# Patient Record
Sex: MALE | Race: WHITE | Employment: FULL TIME | URBAN - METROPOLITAN AREA
[De-identification: names, ages, dates, MRNs, and addresses within clinical notes are randomized per-mention and may not be internally consistent; named-entity substitution may affect disease eponyms.]

---

## 2020-10-08 ENCOUNTER — OFFICE VISIT (OUTPATIENT)
Dept: URGENT CARE | Age: 60
End: 2020-10-08
Payer: COMMERCIAL

## 2020-10-08 VITALS — RESPIRATION RATE: 16 BRPM | HEART RATE: 100 BPM | TEMPERATURE: 98.9 F | OXYGEN SATURATION: 97 %

## 2020-10-08 DIAGNOSIS — Z20.822 CLOSE EXPOSURE TO COVID-19 VIRUS: Primary | ICD-10-CM

## 2020-10-08 PROCEDURE — 99203 OFFICE O/P NEW LOW 30 MIN: CPT | Performed by: FAMILY MEDICINE

## 2020-10-08 NOTE — PROGRESS NOTES
This patient was seen in [facility] at [address] [place] [address] Urgent Care while in their vehicle due to COVID-19 pandemic with PPE and focused examination in order to decrease community viral transmission. The patient/guardian gave verbal consent to treat. Alexa Guerrero is a 61 y.o. male who presents for COVID-19 testing. Was exposed to COVID-19 by someone while at a work site, last contact 6 days ago. Denies cough, fever, SOB. PMH: none. Non-smoker. The history is provided by the patient. History reviewed. No pertinent past medical history. History reviewed. No pertinent surgical history. History reviewed. No pertinent family history.      Social History     Socioeconomic History    Marital status:      Spouse name: Not on file    Number of children: Not on file    Years of education: Not on file    Highest education level: Not on file   Occupational History    Not on file   Social Needs    Financial resource strain: Not on file    Food insecurity     Worry: Not on file     Inability: Not on file    Transportation needs     Medical: Not on file     Non-medical: Not on file   Tobacco Use    Smoking status: Never Smoker    Smokeless tobacco: Never Used   Substance and Sexual Activity    Alcohol use: Not on file    Drug use: Not on file    Sexual activity: Not on file   Lifestyle    Physical activity     Days per week: Not on file     Minutes per session: Not on file    Stress: Not on file   Relationships    Social connections     Talks on phone: Not on file     Gets together: Not on file     Attends Gnosticist service: Not on file     Active member of club or organization: Not on file     Attends meetings of clubs or organizations: Not on file     Relationship status: Not on file    Intimate partner violence     Fear of current or ex partner: Not on file     Emotionally abused: Not on file     Physically abused: Not on file     Forced sexual activity: Not on file   Other Topics Concern    Not on file   Social History Narrative    Not on file                ALLERGIES: Patient has no known allergies. Review of Systems   Constitutional: Negative for activity change, appetite change, chills and fever. HENT: Negative for congestion, rhinorrhea and sore throat. Respiratory: Negative for cough, shortness of breath and wheezing. Cardiovascular: Negative for chest pain. Gastrointestinal: Negative for abdominal pain, diarrhea, nausea and vomiting. Musculoskeletal: Negative for myalgias. Neurological: Negative for headaches. Vitals:    10/08/20 1242   Pulse: 100   Resp: 16   Temp: 98.9 °F (37.2 °C)   SpO2: 97%       Physical Exam  Vitals signs and nursing note reviewed. Constitutional:       General: He is not in acute distress. Appearance: He is well-developed. He is not diaphoretic. Pulmonary:      Effort: Pulmonary effort is normal. No respiratory distress. Breath sounds: Normal breath sounds. No stridor. No wheezing, rhonchi or rales. Neurological:      Mental Status: He is alert. Psychiatric:         Behavior: Behavior normal.         Thought Content: Thought content normal.         Judgment: Judgment normal.         MDM    ICD-10-CM ICD-9-CM   1. Close exposure to COVID-19 virus  Z20.828 V01.79       Orders Placed This Encounter    NOVEL CORONAVIRUS (COVID-19)     Scheduling Instructions:      1) Due to current limited availability of the COVID-19 PCR test, tests will be prioritized and may not be completed.              2) Order only if the test result will change clinical management or necessary for a return to mission-critical employment decision.              3) Print and instruct patient to adhere to [facility] home isolation program. (Link Above)              4) Set up or refer patient for a monitoring program.              5) Have patient sign up for and leverage MyChart (if not previously done).      Order Specific Question:   Is this test for diagnosis or screening? Answer:   Screening     Order Specific Question:   Symptomatic for COVID-19 as defined by CDC? Answer:   No     Order Specific Question:   Hospitalized for COVID-19? Answer:   No     Order Specific Question:   Admitted to ICU for COVID-19? Answer:   No     Order Specific Question:   Employed in healthcare setting? Answer:   No     Order Specific Question:   Resident in a congregate (group) care setting? Answer:   No     Order Specific Question:   Previously tested for COVID-19? Answer:   No        Quarantine, await results      If signs and symptoms become worse the pt is to go to the ER.          Procedures

## 2020-10-08 NOTE — LETTER
October 8, 2020 Shanna Tang [phone] Dear Jennifer Palacios: 
Thank you for requesting access to [employer]. Please follow the instructions below to securely access and download your online medical record. [employer] allows you to send messages to your doctor, view your test results, renew your prescriptions, schedule appointments, and more. How Do I Sign Up? 1. In your internet browser, go to [email or web address]. 2. Click on the First Time User? Click Here link in the Sign In box. You will see the New Member Sign Up page. 3. Enter your [employer] Access Code exactly as it appears below. You will not need to use this code after youve completed the sign-up process. If you do not sign up before the expiration date, you must request a new code. [employer] Access Code: [email or web address] Expires: 11/22/2020 12:35 PM  
 
4. Enter the last four digits of your Social Security Number (xxxx) and Date of Birth (mm/dd/yyyy) as indicated and click Submit. You will be taken to the next sign-up page. 5. Create a [employer] ID. This will be your [employer] login ID and cannot be changed, so think of one that is secure and easy to remember. 6. Create a [employer] password. You can change your password at any time. 7. Enter your Password Reset Question and Answer. This can be used at a later time if you forget your password. 8. Enter your e-mail address. You will receive e-mail notification when new information is available in [address]. 9. Click Sign Up. You can now view and download portions of your medical record. 10. Click the Download Summary menu link to download a portable copy of your medical information. Additional Information If you have questions, please visit the Frequently Asked Questions section of the [employer] website at [email or web address] Remember, [employer] is NOT to be used for urgent needs. For medical emergencies, dial 911. Now available from your iPhone and Android! Sincerely, [employer]

## 2020-10-11 LAB — SARS-COV-2, NAA: NOT DETECTED

## 2021-08-01 ENCOUNTER — HOSPITAL ENCOUNTER (EMERGENCY)
Age: 61
Discharge: HOME | End: 2021-08-01
Attending: EMERGENCY MEDICINE
Payer: COMMERCIAL

## 2021-08-01 VITALS
OXYGEN SATURATION: 96 % | BODY MASS INDEX: 28.29 KG/M2 | SYSTOLIC BLOOD PRESSURE: 146 MMHG | DIASTOLIC BLOOD PRESSURE: 92 MMHG | WEIGHT: 191 LBS | HEIGHT: 69 IN | HEART RATE: 92 BPM | TEMPERATURE: 97.1 F | RESPIRATION RATE: 16 BRPM

## 2021-08-01 DIAGNOSIS — K12.0 ORAL APHTHOUS ULCER: Primary | ICD-10-CM

## 2021-08-01 DIAGNOSIS — L73.9 FOLLICULITIS: ICD-10-CM

## 2021-08-01 PROCEDURE — 99281 EMR DPT VST MAYX REQ PHY/QHP: CPT

## 2021-08-01 RX ORDER — CEPHALEXIN 500 MG/1
500 CAPSULE ORAL 3 TIMES DAILY
Qty: 21 CAPSULE | Refills: 0 | Status: SHIPPED | OUTPATIENT
Start: 2021-08-01 | End: 2021-08-08

## 2021-08-01 NOTE — ED TRIAGE NOTES
Patient reports sores in his mouth, skin rash for about 11/2 -2 weeks. Patient reports no fever, no difficulty swallowing, no other symptoms. Patient is ambulated in Triage.

## 2021-08-01 NOTE — ED PROVIDER NOTES
70-year-old male presents with mouth ulcerations and bumps on his lower legs. Started about a week and 1/2 to 2 weeks ago. He denies any fevers or chills. The mouth ulcerations are very painful. He did rates them 5 out of 10 in severity. He denies any nausea or vomiting. Denies any difficulty swallowing. He denies any other symptoms at this time. No past medical history on file. No past surgical history on file. No family history on file. Social History     Socioeconomic History    Marital status:      Spouse name: Not on file    Number of children: Not on file    Years of education: Not on file    Highest education level: Not on file   Occupational History    Not on file   Tobacco Use    Smoking status: Never Smoker    Smokeless tobacco: Never Used   Substance and Sexual Activity    Alcohol use: Not on file    Drug use: Not on file    Sexual activity: Not on file   Other Topics Concern    Not on file   Social History Narrative    Not on file     Social Determinants of Health     Financial Resource Strain:     Difficulty of Paying Living Expenses:    Food Insecurity:     Worried About Running Out of Food in the Last Year:     [address] in the Last Year:    Transportation Needs:     Lack of Transportation (Medical):  Lack of Transportation (Non-Medical):    Physical Activity:     Days of Exercise per Week:     Minutes of Exercise per Session:    Stress:     Feeling of Stress :    Social Connections:     Frequency of Communication with Friends and Family:     Frequency of Social Gatherings with Friends and Family:     Attends Sabianism Services:     Active Member of Clubs or Organizations:     Attends Club or Organization Meetings:     Marital Status:    Intimate Partner Violence:     Fear of Current or Ex-Partner:     Emotionally Abused:     Physically Abused:     Sexually Abused: ALLERGIES: Patient has no known allergies.     Review of Systems   All other systems reviewed and are negative. There were no vitals filed for this visit. Physical Exam  Vitals and nursing note reviewed. Constitutional:       General: He is not in acute distress. HENT:      Head: Normocephalic and atraumatic. Mouth/Throat:      Comments: Ulcerations under the tongue and right buccal mucosa. No sublingual swelling. Eyes:      General: No scleral icterus. Conjunctiva/sclera: Conjunctivae normal.      Pupils: Pupils are equal, round, and reactive to light. Neck:      Trachea: No tracheal deviation. Cardiovascular:      Rate and Rhythm: Normal rate and regular rhythm. Pulmonary:      Effort: Pulmonary effort is normal. No respiratory distress. Breath sounds: Normal breath sounds. No stridor. Abdominal:      General: There is no distension. Palpations: Abdomen is soft. Tenderness: There is no abdominal tenderness. Genitourinary:     Comments: deferred  Musculoskeletal:         General: No deformity. Cervical back: No rigidity. Skin:     General: Skin is warm and dry. Comments: Pustules on bilateral thighs. Neurological:      General: No focal deficit present. Mental Status: He is alert. Psychiatric:         Mood and Affect: Mood normal.         Behavior: Behavior normal.          MDM       Procedures        6:43 PM  Patient re-evaluated. All questions answered. Patient appropriate for discharge. Given return precautions and follow up instructions. LABORATORY TESTS:  Labs Reviewed - No data to display    IMAGING RESULTS:  No orders to display       MEDICATIONS GIVEN:  Medications - No data to display    IMPRESSION:  1. Oral aphthous ulcer    2. Folliculitis        PLAN:  1.    Current Discharge Medication List      START taking these medications    Details   aluminum-magnesium hydroxide 200-200 mg/5 mL susp 5 mL, diphenhydrAMINE 12.5 mg/5 mL liqd 12.5 mg, lidocaine 2 % soln 5 mL 5 mL by Swish and Spit route two (2) times a day for 7 days. Magic mouth wash   Maalox  Lidocaine 2% viscous   Diphenhydramine oral solution     Pharmacy to mix equal portions of ingredients to a total volume as indicated in the dispense amount. Qty: 100 mL, Refills: 0  Start date: 8/1/2021, End date: 8/8/2021      cephALEXin (Keflex) 500 mg capsule Take 1 Capsule by mouth three (3) times daily for 7 days. Qty: 21 Capsule, Refills: 0  Start date: 8/1/2021, End date: 8/8/2021           2. Follow-up Information     Follow up With Specialties Details Why [address] [place] [address],[address]  Schedule an appointment as soon as possible for a visit   [address] [place] [address]  [phone]    [facility] EM[facility] Emergency Medicine  If symptoms worsen or new concerns [address] [place] [address]  [phone]        3. Return to ED for new or worsening symptoms       Siddharth Dunn. Noah France MD        Please note that this dictation was completed with [employer], the [employer] voice recognition software. Quite often unanticipated grammatical, syntax, homophones, and other interpretive errors are inadvertently transcribed by the computer software. Please disregard these errors. Please excuse any errors that have escaped final proofreading.

## 2021-08-02 NOTE — ED NOTES
1:16 PM  Called by mindy - 'couldn't get the 'magic mouthwash' with al hydrox and pepto/ we have it with simethicone'; approved substitution;

## 2021-11-07 ENCOUNTER — APPOINTMENT (OUTPATIENT)
Dept: GENERAL RADIOLOGY | Age: 61
End: 2021-11-07
Attending: NURSE PRACTITIONER
Payer: COMMERCIAL

## 2021-11-07 ENCOUNTER — HOSPITAL ENCOUNTER (EMERGENCY)
Age: 61
Discharge: HOME | End: 2021-11-07
Attending: EMERGENCY MEDICINE
Payer: COMMERCIAL

## 2021-11-07 VITALS
SYSTOLIC BLOOD PRESSURE: 118 MMHG | RESPIRATION RATE: 18 BRPM | DIASTOLIC BLOOD PRESSURE: 79 MMHG | OXYGEN SATURATION: 97 % | TEMPERATURE: 99 F | HEART RATE: 100 BPM

## 2021-11-07 DIAGNOSIS — J18.9 PNEUMONIA OF LEFT LUNG DUE TO INFECTIOUS ORGANISM, UNSPECIFIED PART OF LUNG: Primary | ICD-10-CM

## 2021-11-07 DIAGNOSIS — U07.1 COVID-19: ICD-10-CM

## 2021-11-07 DIAGNOSIS — Z20.822 EXPOSURE TO COVID-19 VIRUS: ICD-10-CM

## 2021-11-07 LAB
ALBUMIN SERPL-MCNC: 3.4 G/DL (ref 3.5–5)
ALBUMIN/GLOB SERPL: 1 {RATIO} (ref 1.1–2.2)
ALP SERPL-CCNC: 66 U/L (ref 45–117)
ALT SERPL-CCNC: 25 U/L (ref 12–78)
ANION GAP SERPL CALC-SCNC: 10 MMOL/L (ref 5–15)
AST SERPL-CCNC: 24 U/L (ref 15–37)
BASOPHILS # BLD: 0.1 K/UL (ref 0–0.1)
BASOPHILS NFR BLD: 1 % (ref 0–1)
BILIRUB SERPL-MCNC: 1.1 MG/DL (ref 0.2–1)
BUN SERPL-MCNC: 28 MG/DL (ref 6–20)
BUN/CREAT SERPL: 20 (ref 12–20)
CALCIUM SERPL-MCNC: 8.4 MG/DL (ref 8.5–10.1)
CHLORIDE SERPL-SCNC: 100 MMOL/L (ref 97–108)
CO2 SERPL-SCNC: 25 MMOL/L (ref 21–32)
COVID-19 RAPID TEST, COVR: DETECTED
CREAT SERPL-MCNC: 1.38 MG/DL (ref 0.7–1.3)
DEPRECATED S PYO AG THROAT QL EIA: NEGATIVE
DIFFERENTIAL METHOD BLD: ABNORMAL
EOSINOPHIL # BLD: 0 K/UL (ref 0–0.4)
EOSINOPHIL NFR BLD: 0 % (ref 0–7)
ERYTHROCYTE [DISTWIDTH] IN BLOOD BY AUTOMATED COUNT: 15.3 % (ref 11.5–14.5)
GLOBULIN SER CALC-MCNC: 3.5 G/DL (ref 2–4)
GLUCOSE SERPL-MCNC: 113 MG/DL (ref 65–100)
HCT VFR BLD AUTO: 40.3 % (ref 36.6–50.3)
HGB BLD-MCNC: 13.7 G/DL (ref 12.1–17)
IMM GRANULOCYTES # BLD AUTO: 0 K/UL (ref 0–0.04)
IMM GRANULOCYTES NFR BLD AUTO: 0 % (ref 0–0.5)
LYMPHOCYTES # BLD: 0.8 K/UL (ref 0.8–3.5)
LYMPHOCYTES NFR BLD: 7 % (ref 12–49)
MCH RBC QN AUTO: 27.5 PG (ref 26–34)
MCHC RBC AUTO-ENTMCNC: 34 G/DL (ref 30–36.5)
MCV RBC AUTO: 80.9 FL (ref 80–99)
METAMYELOCYTES NFR BLD MANUAL: 1 %
MONOCYTES # BLD: 1.5 K/UL (ref 0–1)
MONOCYTES NFR BLD: 13 % (ref 5–13)
NEUTS BAND NFR BLD MANUAL: 23 %
NEUTS SEG # BLD: 8.7 K/UL (ref 1.8–8)
NEUTS SEG NFR BLD: 55 % (ref 32–75)
NRBC # BLD: 0 K/UL (ref 0–0.01)
NRBC BLD-RTO: 0 PER 100 WBC
PLATELET # BLD AUTO: 181 K/UL (ref 150–400)
PMV BLD AUTO: 9.3 FL (ref 8.9–12.9)
POTASSIUM SERPL-SCNC: 3.4 MMOL/L (ref 3.5–5.1)
PROT SERPL-MCNC: 6.9 G/DL (ref 6.4–8.2)
RBC # BLD AUTO: 4.98 M/UL (ref 4.1–5.7)
RBC MORPH BLD: ABNORMAL
SODIUM SERPL-SCNC: 135 MMOL/L (ref 136–145)
SOURCE, COVRS: ABNORMAL
WBC # BLD AUTO: 11.2 K/UL (ref 4.1–11.1)
WBC MORPH BLD: ABNORMAL

## 2021-11-07 PROCEDURE — 99281 EMR DPT VST MAYX REQ PHY/QHP: CPT

## 2021-11-07 PROCEDURE — 80053 COMPREHEN METABOLIC PANEL: CPT

## 2021-11-07 PROCEDURE — 87635 SARS-COV-2 COVID-19 AMP PRB: CPT

## 2021-11-07 PROCEDURE — 74011250637 HC RX REV CODE- 250/637: Performed by: NURSE PRACTITIONER

## 2021-11-07 PROCEDURE — 85025 COMPLETE CBC W/AUTO DIFF WBC: CPT

## 2021-11-07 PROCEDURE — 87880 STREP A ASSAY W/OPTIC: CPT

## 2021-11-07 PROCEDURE — 74011250636 HC RX REV CODE- 250/636: Performed by: NURSE PRACTITIONER

## 2021-11-07 PROCEDURE — 71045 X-RAY EXAM CHEST 1 VIEW: CPT

## 2021-11-07 PROCEDURE — 36415 COLL VENOUS BLD VENIPUNCTURE: CPT

## 2021-11-07 RX ORDER — AZITHROMYCIN 250 MG/1
TABLET, FILM COATED ORAL
Qty: 6 TABLET | Refills: 0 | Status: SHIPPED | OUTPATIENT
Start: 2021-11-07 | End: 2021-11-11

## 2021-11-07 RX ORDER — ALBUTEROL SULFATE 90 UG/1
1 AEROSOL, METERED RESPIRATORY (INHALATION)
Qty: 18 G | Refills: 0 | Status: SHIPPED | OUTPATIENT
Start: 2021-11-07

## 2021-11-07 RX ORDER — ACETAMINOPHEN 500 MG
1000 TABLET ORAL
Status: DISCONTINUED | OUTPATIENT
Start: 2021-11-07 | End: 2021-11-07 | Stop reason: HOSPADM

## 2021-11-07 RX ADMIN — SODIUM CHLORIDE 1000 ML: 9 INJECTION, SOLUTION INTRAVENOUS at 12:33

## 2021-11-07 NOTE — ED PROVIDER NOTES
This is a 51-year-old male who presents ambulatory to the emergency room with complaints of general malaise, sore throat, nausea, diarrhea in the setting of a COVID-19 exposure. Patient states on 10/25 he received his Junius Lillian vaccine. States on Wednesday he developed Covid-like symptoms after known exposure. Patient denies any chest pain, mild shortness of breath, positive dizziness. Positive nausea, no vomiting. Positive diarrhea. No urinary urgency or frequency, hematuria. Patient does state that he has a sore throat which is limiting his p.o. intake. Per wife he is not eating or drinking anything over the past 24 hours secondary to the increased pain in his throat. Seen to be tachycardic in triage with a rate of 108. Has not taken any medication prior to arrival for his symptoms other than Tylenol which is not effective per patient. Lives with his wife and daughter who are both nonvaccinated. There are no further complaints at this time. [place], [place]  No past medical history on file. No past surgical history on file. Mitchel Perez was evaluated in the Emergency Department on (Not on file) for the symptoms described in the history of present illness. He/she was evaluated in the context of the global COVID-19 pandemic, which necessitated consideration that the patient might be at risk for infection with the SARS-CoV-2 virus that causes COVID-19. Institutional protocols and algorithms that pertain to the evaluation of patients at risk for COVID-19 are in a state of rapid change based on information released by regulatory bodies including the CDC and federal and state organizations. These policies and algorithms were followed during the patient's care in the ED.     Surrogate Decision Maker (Who do you want to make decisions for you in the event you are not able to?): Extended Emergency Contact Information  Primary Emergency Contact: Kevin Khan  Address: [address] [place] [address] [address] William [phone]  Mobile Phone: [phone]  Relation: Spouse               No past medical history on file. No past surgical history on file. No family history on file. Social History     Socioeconomic History    Marital status:      Spouse name: Not on file    Number of children: Not on file    Years of education: Not on file    Highest education level: Not on file   Occupational History    Not on file   Tobacco Use    Smoking status: Never Smoker    Smokeless tobacco: Never Used   Substance and Sexual Activity    Alcohol use: Not on file    Drug use: Not on file    Sexual activity: Not on file   Other Topics Concern    Not on file   Social History Narrative    Not on file     Social Determinants of Health     Financial Resource Strain:     Difficulty of Paying Living Expenses: Not on file   Food Insecurity:     Worried About Running Out of Food in the Last Year: Not on file    Bree of Food in the Last Year: Not on file   Transportation Needs:     Lack of Transportation (Medical): Not on file    Lack of Transportation (Non-Medical):  Not on file   Physical Activity:     Days of Exercise per Week: Not on file    Minutes of Exercise per Session: Not on file   Stress:     Feeling of Stress : Not on file   Social Connections:     Frequency of Communication with Friends and Family: Not on file    Frequency of Social Gatherings with Friends and Family: Not on file    Attends Sabianism Services: Not on file    Active Member of Clubs or Organizations: Not on file    Attends Club or Organization Meetings: Not on file    Marital Status: Not on file   Intimate Partner Violence:     Fear of Current or Ex-Partner: Not on file    Emotionally Abused: Not on file    Physically Abused: Not on file    Sexually Abused: Not on file   Housing Stability:     Unable to Pay for Housing in the Last Year: Not on file    Number of [place] in the Last Year: Not on file    Unstable Housing in the Last Year: Not on file         ALLERGIES: Patient has no known allergies. Review of Systems   Constitutional: Positive for activity change, appetite change, chills, fatigue and fever. HENT: Positive for sore throat and trouble swallowing. Negative for congestion, ear discharge, ear pain, sinus pressure and sinus pain. Eyes: Negative for photophobia, pain, redness, itching and visual disturbance. Respiratory: Negative for chest tightness and shortness of breath. Cardiovascular: Negative for chest pain and palpitations. Gastrointestinal: Positive for diarrhea and nausea. Negative for abdominal distention, abdominal pain and vomiting. Endocrine: Negative. Genitourinary: Negative for difficulty urinating, frequency and urgency. Musculoskeletal: Positive for arthralgias (general body aches and pains). Negative for back pain, neck pain and neck stiffness. Skin: Negative for color change, pallor, rash and wound. Allergic/Immunologic: Negative. Neurological: Positive for dizziness. Negative for syncope, weakness and headaches. Hematological: Does not bruise/bleed easily. Psychiatric/Behavioral: Negative for behavioral problems. The patient is not nervous/anxious. Vitals:    11/07/21 1056   BP: 121/69   Pulse: (!) 108   Resp: 24   Temp: (!) 100.5 °F (38.1 °C)   SpO2: 96%            Physical Exam  Vitals and nursing note reviewed. Constitutional:       General: He is awake. He is not in acute distress. Appearance: Normal appearance. He is well-developed. He is not diaphoretic. HENT:      Head: Normocephalic and atraumatic. Right Ear: External ear normal.      Left Ear: External ear normal.      Nose: Nose normal. No congestion. Mouth/Throat:      Mouth: Mucous membranes are moist.      Pharynx: Posterior oropharyngeal erythema present. Comments: Uvula midline    Eyes:      General:         Right eye: No discharge.          Left eye: No discharge. Conjunctiva/sclera: Conjunctivae normal.      Pupils: Pupils are equal, round, and reactive to light. Neck:      Vascular: No JVD. Trachea: No tracheal deviation. Comments: Full range of motion, no neurological deficits. Cardiovascular:      Rate and Rhythm: Regular rhythm. Tachycardia present. Pulses: Normal pulses. Heart sounds: Normal heart sounds. No murmur heard. No gallop. Pulmonary:      Effort: Pulmonary effort is normal. No respiratory distress. Breath sounds: No wheezing or rales. Comments: Rhonchi bilateral bases  Chest:      Chest wall: No tenderness. Abdominal:      General: Bowel sounds are normal. There is no distension. Palpations: Abdomen is soft. Tenderness: There is no abdominal tenderness. There is no guarding or rebound. Genitourinary:     Comments: Deferred    Musculoskeletal:         General: Tenderness (general body aches and pains) present. Normal range of motion. Cervical back: Normal range of motion and neck supple. Lymphadenopathy:      Cervical: No cervical adenopathy. Skin:     General: Skin is warm and dry. Capillary Refill: Capillary refill takes less than 2 seconds. Coloration: Skin is not pale. Findings: No erythema or rash. Neurological:      General: No focal deficit present. Mental Status: He is alert and oriented to person, place, and time. Coordination: Coordination normal.   Psychiatric:         Mood and Affect: Mood normal.         Behavior: Behavior normal. Behavior is cooperative. Thought Content:  Thought content normal.         Judgment: Judgment normal.          MDM  Number of Diagnoses or Management Options  COVID-19: new and requires workup  Exposure to COVID-19 virus: new and requires workup  Pneumonia of left lung due to infectious organism, unspecified part of lung: new and requires workup  Diagnosis management comments: Differential diagnosis includes strep, pneumonia, COVID-19 virus and others. After physical examination and review of imaging and laboratory data, patient was diagnosed with COVID-19, COVID-19 pneumonia. Discharged home and isolate per CDC recommendations. Return to the emergency room with worsening symptoms. Otherwise follow-up with PCP. Patient in agreement with plan of care. Amount and/or Complexity of Data Reviewed  Clinical lab tests: ordered and reviewed  Tests in the radiology section of CPT®: ordered and reviewed         Labs Reviewed   COVID-19 RAPID TEST - Abnormal; Notable for the following components:       Result Value    COVID-19 rapid test Detected (*)     All other components within normal limits   CBC WITH AUTOMATED DIFF - Abnormal; Notable for the following components:    WBC 11.2 (*)     RDW 15.3 (*)     LYMPHOCYTES 7 (*)     ABS. NEUTROPHILS 8.7 (*)     ABS. MONOCYTES 1.5 (*)     All other components within normal limits   METABOLIC PANEL, COMPREHENSIVE - Abnormal; Notable for the following components:    Sodium 135 (*)     Potassium 3.4 (*)     Glucose 113 (*)     BUN 28 (*)     Creatinine 1.38 (*)     GFR est non-AA 52 (*)     Calcium 8.4 (*)     Bilirubin, total 1.1 (*)     Albumin 3.4 (*)     A-G Ratio 1.0 (*)     All other components within normal limits   STREP AG SCREEN, GROUP A   CULTURE, THROAT     XR CHEST PORT    Result Date: 11/7/2021  1. Left infrahilar opacity may represent pneumonia. Linear atelectasis in the lingula. 1:53 PM  Pt has been reexamined. Pt has no new complaints, changes or physical findings. Care plan outlined and precautions discussed. All available results were reviewed with pt. All medications were reviewed with pt. All of pt's questions and concerns were addressed. Pt agrees to F/U as instructed and agrees to return to ED upon further deterioration. Pt is ready to go home.   Mane Lopez NP    Please note that this dictation was completed with [employer], the [employer] voice recognition software. Quite often unanticipated grammatical, syntax, homophones, and other interpretive errors are inadvertently transcribed by the computer software. Please disregard these errors. Please excuse any errors that have escaped final proofreading. Thank you.     Procedures

## 2021-11-07 NOTE — Clinical Note
[address] [place] [address] Dr 
[facility] EMERGENCY DEPT 
[address] [facility] [place] [id number] [phone] Work/School Note Date: 11/7/2021 To Whom It May concern: 
 
Arlene Fraga was evaulated by the following provider(s): 
Attending Provider: Kraig Thomson MD 
Nurse Practitioner: Kiko Lemus NP.   [address] virus is suspected. Per the CDC guidelines we recommend home isolation until the following conditions are all met: 1. At least 10 days have passed since symptoms first appeared and 2. At least 24 hours have passed since last fever without the use of fever-reducing medications and 
3. Symptoms (e.g., cough, shortness of breath) have improved Sincerely, 
 
 
 
 
Lise Kelly NP

## 2021-11-07 NOTE — Clinical Note
[facility] 
[facility] EMERGENCY DEPT 
[address] [facility] [place] [id number] [phone] Work/School Note Date: 11/7/2021 To Whom It May concern: 
 
Kenia Miranda was evaulated by the following provider(s): 
Attending Provider: Diana Sandoval MD 
Nurse Practitioner: Gabriela Boas, NP.   [address] [place] virus is suspected. Per the CDC guidelines we recommend home isolation until the following conditions are all met: 1. At least 10 days have passed since symptoms first appeared and 2. At least 24 hours have passed since last fever without the use of fever-reducing medications and 
3. Symptoms (e.g., cough, shortness of breath) have improved Sincerely, 
 
 
 
 
Hellen Lo NP

## 2021-11-08 ENCOUNTER — PATIENT OUTREACH (OUTPATIENT)
Dept: CASE MANAGEMENT | Age: 61
End: 2021-11-08

## 2021-11-08 NOTE — PROGRESS NOTES
Ambulatory Care Management Notes    Date/Time:  2021 11:58 AM  Patient contacted regarding COVID-19 diagnosis, pulse oximeter ordered at discharge. Discussed COVID-19 related testing which was available at this time. Test results were positive. Patient informed of results, if available? No, pt was already aware of result. Ambulatory Care Manager contacted the patient by telephone to perform post discharge assessment. Call within 2 business days of discharge: Yes Verified name and  with patient as identifiers. Provided introduction to self, and explanation of the CTN/ACM role, and reason for call due to risk factors for infection and/or exposure to COVID-19. Symptoms reviewed with patient who verbalized the following symptoms: no new symptoms and no worsening symptoms , pt reports he is getting more rest and is drinking more fluids now      Due to no new or worsening symptoms encounter was not routed to provider for escalation. Discussed follow-up appointments. If no appointment was previously scheduled, appointment scheduling offered:  no. [facility] follow up appointment(s): No future appointments. Non-[facility] follow up appointment(s): n/a    Interventions to address risk factors: Pt was encouraged to call his PCP today for f/u VV. Pt agreed. Advance Care Planning:   Does patient have an Advance Directive: not on file. Educated patient about risk for severe COVID-19 due to risk factors according to CDC guidelines. ACM reviewed discharge instructions, medical action plan and red flag symptoms with the patient who verbalized understanding. Discussed COVID vaccination status: yes, pt recently rec'd a one and only covid vaccine (Gibran&Gibran). Education provided on COVID-19 vaccination as appropriate. Discussed exposure protocols and quarantine with CDC Guidelines.  Patient was given an opportunity to verbalize any questions and concerns and agrees to contact ACM or health care provider for questions related to their healthcare. Reviewed and educated patient on any new and changed medications related to discharge diagnosis     Was patient discharged with a pulse oximeter? yes Discussed and confirmed pulse oximeter discharge instructions and when to notify provider or seek emergency care. Per pt, his POx is running 91-93% with a HR of . Pt instructed to seek care at ED if POx hits 89% or lower. Pt verbalized understanding. ACM provided contact information. Plan for follow-up call in 1-2 days based on severity of symptoms and risk factors.  /dla      Date/Time:  11/8/2021 8:59 AM   Call within 2 business days of discharge: Yes   Attempted to reach patient by telephone. Left HIPPA compliant messages w/pt and spouse requesting a return call with contact information provided.   Will attempt to reach patient again.   /dla

## 2021-11-10 ENCOUNTER — PATIENT OUTREACH (OUTPATIENT)
Dept: CASE MANAGEMENT | Age: 61
End: 2021-11-10

## 2021-11-10 NOTE — PROGRESS NOTES
Ambulatory Care Management Note     Date/Time:  11/10/2021 10:13 AM     Patient resolved from [address] episode on 11/10/21. Discussed COVID-19 related testing which was available at this time. Test results were positive. Patient informed of results, if available? No, pt was already aware of result.      Patient/family has been provided the following resources and education related to COVID-19:                         Signs, symptoms and red flags related to COVID-19            CDC exposure and quarantine guidelines            [facility] exposure contact - [phone]            Contact for their local Department of Health                 Patient currently reports that the following symptoms have improved:  no new symptoms and no worsening symptoms. Pt reports POx reading remain in the low 90's but he is not experiencing any SOB. He is still using the albuterol MDI and finishes the azithromycin tomorrow. He will be following up w/his PCP tomorrow, 11/11/21. No further outreach scheduled with this CTN/ACM/LPN/HC/ MA. Episode of Care resolved.   Patient has this CTN/ACM/LPN/HC/MA contact information if future needs arise.  Edie Rico

## 2021-11-11 ENCOUNTER — HOSPITAL ENCOUNTER (INPATIENT)
Age: 61
LOS: 7 days | Discharge: HOME | DRG: 871 | End: 2021-11-18
Attending: STUDENT IN AN ORGANIZED HEALTH CARE EDUCATION/TRAINING PROGRAM | Admitting: INTERNAL MEDICINE
Payer: COMMERCIAL

## 2021-11-11 ENCOUNTER — APPOINTMENT (OUTPATIENT)
Dept: GENERAL RADIOLOGY | Age: 61
DRG: 871 | End: 2021-11-11
Attending: STUDENT IN AN ORGANIZED HEALTH CARE EDUCATION/TRAINING PROGRAM
Payer: COMMERCIAL

## 2021-11-11 DIAGNOSIS — U07.1 ACUTE HYPOXEMIC RESPIRATORY FAILURE DUE TO COVID-19 (HCC): Primary | ICD-10-CM

## 2021-11-11 DIAGNOSIS — J96.01 ACUTE HYPOXEMIC RESPIRATORY FAILURE DUE TO COVID-19 (HCC): Primary | ICD-10-CM

## 2021-11-11 PROCEDURE — 96374 THER/PROPH/DIAG INJ IV PUSH: CPT

## 2021-11-11 PROCEDURE — 71045 X-RAY EXAM CHEST 1 VIEW: CPT

## 2021-11-11 PROCEDURE — 99284 EMERGENCY DEPT VISIT MOD MDM: CPT

## 2021-11-11 PROCEDURE — 83880 ASSAY OF NATRIURETIC PEPTIDE: CPT

## 2021-11-11 PROCEDURE — 96372 THER/PROPH/DIAG INJ SC/IM: CPT

## 2021-11-11 PROCEDURE — 96375 TX/PRO/DX INJ NEW DRUG ADDON: CPT

## 2021-11-11 PROCEDURE — 65660000000 HC RM CCU STEPDOWN

## 2021-11-11 PROCEDURE — 85379 FIBRIN DEGRADATION QUANT: CPT

## 2021-11-11 PROCEDURE — 65270000029 HC RM PRIVATE

## 2021-11-11 RX ORDER — DEXAMETHASONE SODIUM PHOSPHATE 4 MG/ML
6 INJECTION, SOLUTION INTRA-ARTICULAR; INTRALESIONAL; INTRAMUSCULAR; INTRAVENOUS; SOFT TISSUE ONCE
Status: COMPLETED | OUTPATIENT
Start: 2021-11-11 | End: 2021-11-12

## 2021-11-12 ENCOUNTER — APPOINTMENT (OUTPATIENT)
Dept: CT IMAGING | Age: 61
DRG: 871 | End: 2021-11-12
Attending: INTERNAL MEDICINE
Payer: COMMERCIAL

## 2021-11-12 ENCOUNTER — PATIENT OUTREACH (OUTPATIENT)
Dept: CASE MANAGEMENT | Age: 61
End: 2021-11-12

## 2021-11-12 PROBLEM — R00.0 SINUS TACHYCARDIA: Status: ACTIVE | Noted: 2021-11-12

## 2021-11-12 PROBLEM — R50.9 FEVER: Status: ACTIVE | Noted: 2021-11-12

## 2021-11-12 PROBLEM — J12.82 PNEUMONIA DUE TO COVID-19 VIRUS: Status: ACTIVE | Noted: 2021-11-12

## 2021-11-12 PROBLEM — U07.1 PNEUMONIA DUE TO COVID-19 VIRUS: Status: ACTIVE | Noted: 2021-11-12

## 2021-11-12 PROBLEM — A41.9 SEPSIS (HCC): Status: ACTIVE | Noted: 2021-11-12

## 2021-11-12 LAB
ALBUMIN SERPL-MCNC: 2.5 G/DL (ref 3.5–5)
ALBUMIN/GLOB SERPL: 0.6 {RATIO} (ref 1.1–2.2)
ALP SERPL-CCNC: 56 U/L (ref 45–117)
ALT SERPL-CCNC: 59 U/L (ref 12–78)
ANION GAP SERPL CALC-SCNC: 5 MMOL/L (ref 5–15)
AST SERPL-CCNC: 70 U/L (ref 15–37)
BASOPHILS # BLD: 0 K/UL (ref 0–0.1)
BASOPHILS NFR BLD: 0 % (ref 0–1)
BILIRUB SERPL-MCNC: 0.6 MG/DL (ref 0.2–1)
BNP SERPL-MCNC: 133 PG/ML
BUN SERPL-MCNC: 30 MG/DL (ref 6–20)
BUN/CREAT SERPL: 24 (ref 12–20)
CALCIUM SERPL-MCNC: 8.5 MG/DL (ref 8.5–10.1)
CHLORIDE SERPL-SCNC: 104 MMOL/L (ref 97–108)
CO2 SERPL-SCNC: 26 MMOL/L (ref 21–32)
COMMENT, HOLDF: NORMAL
CREAT SERPL-MCNC: 1.25 MG/DL (ref 0.7–1.3)
CRP SERPL HS-MCNC: >9.5 MG/L
D DIMER PPP FEU-MCNC: 2.46 MG/L FEU (ref 0–0.65)
DIFFERENTIAL METHOD BLD: ABNORMAL
EOSINOPHIL # BLD: 0.1 K/UL (ref 0–0.4)
EOSINOPHIL NFR BLD: 1 % (ref 0–7)
ERYTHROCYTE [DISTWIDTH] IN BLOOD BY AUTOMATED COUNT: 15.9 % (ref 11.5–14.5)
EST. AVERAGE GLUCOSE BLD GHB EST-MCNC: 114 MG/DL
FERRITIN SERPL-MCNC: 7750 NG/ML (ref 26–388)
GLOBULIN SER CALC-MCNC: 4.2 G/DL (ref 2–4)
GLUCOSE SERPL-MCNC: 129 MG/DL (ref 65–100)
HBA1C MFR BLD: 5.6 % (ref 4–5.6)
HCT VFR BLD AUTO: 44.9 % (ref 36.6–50.3)
HGB BLD-MCNC: 15.2 G/DL (ref 12.1–17)
IMM GRANULOCYTES # BLD AUTO: 0 K/UL
IMM GRANULOCYTES NFR BLD AUTO: 0 %
LDH SERPL L TO P-CCNC: 652 U/L (ref 85–241)
LYMPHOCYTES # BLD: 1 K/UL (ref 0.8–3.5)
LYMPHOCYTES NFR BLD: 12 % (ref 12–49)
MAGNESIUM SERPL-MCNC: 2.4 MG/DL (ref 1.6–2.4)
MCH RBC QN AUTO: 27.1 PG (ref 26–34)
MCHC RBC AUTO-ENTMCNC: 33.9 G/DL (ref 30–36.5)
MCV RBC AUTO: 80.2 FL (ref 80–99)
METAMYELOCYTES NFR BLD MANUAL: 6 %
MONOCYTES # BLD: 0.2 K/UL (ref 0–1)
MONOCYTES NFR BLD: 2 % (ref 5–13)
NEUTS BAND NFR BLD MANUAL: 20 % (ref 0–6)
NEUTS SEG # BLD: 6.4 K/UL (ref 1.8–8)
NEUTS SEG NFR BLD: 59 % (ref 32–75)
NRBC # BLD: 0 K/UL (ref 0–0.01)
NRBC BLD-RTO: 0 PER 100 WBC
PHOSPHATE SERPL-MCNC: 4.5 MG/DL (ref 2.6–4.7)
PLATELET # BLD AUTO: 108 K/UL (ref 150–400)
PMV BLD AUTO: 11.5 FL (ref 8.9–12.9)
POTASSIUM SERPL-SCNC: 3.6 MMOL/L (ref 3.5–5.1)
PROCALCITONIN SERPL-MCNC: 1.34 NG/ML
PROT SERPL-MCNC: 6.7 G/DL (ref 6.4–8.2)
RBC # BLD AUTO: 5.6 M/UL (ref 4.1–5.7)
RBC MORPH BLD: ABNORMAL
SAMPLES BEING HELD,HOLD: NORMAL
SODIUM SERPL-SCNC: 135 MMOL/L (ref 136–145)
TROPONIN-HIGH SENSITIVITY: 15 NG/L (ref 0–76)
TSH SERPL DL<=0.05 MIU/L-ACNC: 0.6 UIU/ML (ref 0.36–3.74)
WBC # BLD AUTO: 8.1 K/UL (ref 4.1–11.1)

## 2021-11-12 PROCEDURE — 74011000258 HC RX REV CODE- 258: Performed by: INTERNAL MEDICINE

## 2021-11-12 PROCEDURE — 84145 PROCALCITONIN (PCT): CPT

## 2021-11-12 PROCEDURE — 71275 CT ANGIOGRAPHY CHEST: CPT

## 2021-11-12 PROCEDURE — 74011250636 HC RX REV CODE- 250/636: Performed by: INTERNAL MEDICINE

## 2021-11-12 PROCEDURE — 84100 ASSAY OF PHOSPHORUS: CPT

## 2021-11-12 PROCEDURE — 86141 C-REACTIVE PROTEIN HS: CPT

## 2021-11-12 PROCEDURE — 80053 COMPREHEN METABOLIC PANEL: CPT

## 2021-11-12 PROCEDURE — 36415 COLL VENOUS BLD VENIPUNCTURE: CPT

## 2021-11-12 PROCEDURE — 74011000636 HC RX REV CODE- 636: Performed by: INTERNAL MEDICINE

## 2021-11-12 PROCEDURE — 86140 C-REACTIVE PROTEIN: CPT

## 2021-11-12 PROCEDURE — 74011250636 HC RX REV CODE- 250/636: Performed by: STUDENT IN AN ORGANIZED HEALTH CARE EDUCATION/TRAINING PROGRAM

## 2021-11-12 PROCEDURE — 85379 FIBRIN DEGRADATION QUANT: CPT

## 2021-11-12 PROCEDURE — 74011000250 HC RX REV CODE- 250: Performed by: INTERNAL MEDICINE

## 2021-11-12 PROCEDURE — 74011250637 HC RX REV CODE- 250/637: Performed by: INTERNAL MEDICINE

## 2021-11-12 PROCEDURE — 83615 LACTATE (LD) (LDH) ENZYME: CPT

## 2021-11-12 PROCEDURE — 83036 HEMOGLOBIN GLYCOSYLATED A1C: CPT

## 2021-11-12 PROCEDURE — 84484 ASSAY OF TROPONIN QUANT: CPT

## 2021-11-12 PROCEDURE — 84443 ASSAY THYROID STIM HORMONE: CPT

## 2021-11-12 PROCEDURE — 83735 ASSAY OF MAGNESIUM: CPT

## 2021-11-12 PROCEDURE — 82728 ASSAY OF FERRITIN: CPT

## 2021-11-12 PROCEDURE — 85025 COMPLETE CBC W/AUTO DIFF WBC: CPT

## 2021-11-12 PROCEDURE — 65660000000 HC RM CCU STEPDOWN

## 2021-11-12 PROCEDURE — 94640 AIRWAY INHALATION TREATMENT: CPT

## 2021-11-12 RX ORDER — ASCORBIC ACID 500 MG
250 TABLET ORAL 2 TIMES DAILY
Status: DISCONTINUED | OUTPATIENT
Start: 2021-11-12 | End: 2021-11-18 | Stop reason: HOSPADM

## 2021-11-12 RX ORDER — DEXAMETHASONE 6 MG/1
6 TABLET ORAL DAILY
Status: DISCONTINUED | OUTPATIENT
Start: 2021-11-12 | End: 2021-11-12

## 2021-11-12 RX ORDER — SODIUM CHLORIDE 0.9 % (FLUSH) 0.9 %
5-40 SYRINGE (ML) INJECTION AS NEEDED
Status: DISCONTINUED | OUTPATIENT
Start: 2021-11-12 | End: 2021-11-18 | Stop reason: HOSPADM

## 2021-11-12 RX ORDER — ENOXAPARIN SODIUM 100 MG/ML
30 INJECTION SUBCUTANEOUS EVERY 12 HOURS
Status: DISCONTINUED | OUTPATIENT
Start: 2021-11-12 | End: 2021-11-18 | Stop reason: HOSPADM

## 2021-11-12 RX ORDER — ONDANSETRON 4 MG/1
4 TABLET, ORALLY DISINTEGRATING ORAL
Status: DISCONTINUED | OUTPATIENT
Start: 2021-11-12 | End: 2021-11-18 | Stop reason: HOSPADM

## 2021-11-12 RX ORDER — ACETAMINOPHEN 325 MG/1
650 TABLET ORAL
Status: DISCONTINUED | OUTPATIENT
Start: 2021-11-12 | End: 2021-11-18 | Stop reason: HOSPADM

## 2021-11-12 RX ORDER — PANTOPRAZOLE SODIUM 40 MG/1
40 TABLET, DELAYED RELEASE ORAL
Status: DISCONTINUED | OUTPATIENT
Start: 2021-11-12 | End: 2021-11-18 | Stop reason: HOSPADM

## 2021-11-12 RX ORDER — SODIUM CHLORIDE 0.9 % (FLUSH) 0.9 %
5-40 SYRINGE (ML) INJECTION EVERY 8 HOURS
Status: DISCONTINUED | OUTPATIENT
Start: 2021-11-12 | End: 2021-11-18 | Stop reason: HOSPADM

## 2021-11-12 RX ORDER — ALBUTEROL SULFATE 90 UG/1
1 AEROSOL, METERED RESPIRATORY (INHALATION)
Status: DISCONTINUED | OUTPATIENT
Start: 2021-11-12 | End: 2021-11-12

## 2021-11-12 RX ORDER — BUDESONIDE AND FORMOTEROL FUMARATE DIHYDRATE 80; 4.5 UG/1; UG/1
2 AEROSOL RESPIRATORY (INHALATION)
Status: DISCONTINUED | OUTPATIENT
Start: 2021-11-12 | End: 2021-11-18 | Stop reason: HOSPADM

## 2021-11-12 RX ORDER — GUAIFENESIN/DEXTROMETHORPHAN 100-10MG/5
5 SYRUP ORAL
Status: DISCONTINUED | OUTPATIENT
Start: 2021-11-12 | End: 2021-11-18 | Stop reason: HOSPADM

## 2021-11-12 RX ORDER — ACETAMINOPHEN 650 MG/1
650 SUPPOSITORY RECTAL
Status: DISCONTINUED | OUTPATIENT
Start: 2021-11-12 | End: 2021-11-12

## 2021-11-12 RX ORDER — ZINC SULFATE 50(220)MG
1 CAPSULE ORAL DAILY
Status: DISCONTINUED | OUTPATIENT
Start: 2021-11-12 | End: 2021-11-18 | Stop reason: HOSPADM

## 2021-11-12 RX ORDER — POLYETHYLENE GLYCOL 3350 17 G/17G
17 POWDER, FOR SOLUTION ORAL DAILY PRN
Status: DISCONTINUED | OUTPATIENT
Start: 2021-11-12 | End: 2021-11-18 | Stop reason: HOSPADM

## 2021-11-12 RX ADMIN — DOXYCYCLINE 100 MG: 100 INJECTION, POWDER, LYOPHILIZED, FOR SOLUTION INTRAVENOUS at 01:24

## 2021-11-12 RX ADMIN — BUDESONIDE AND FORMOTEROL FUMARATE DIHYDRATE 2 PUFF: 80; 4.5 AEROSOL RESPIRATORY (INHALATION) at 20:05

## 2021-11-12 RX ADMIN — Medication 10 ML: at 20:07

## 2021-11-12 RX ADMIN — BARICITINIB 4 MG: 2 TABLET, FILM COATED ORAL at 08:40

## 2021-11-12 RX ADMIN — OXYCODONE HYDROCHLORIDE AND ACETAMINOPHEN 250 MG: 500 TABLET ORAL at 08:40

## 2021-11-12 RX ADMIN — ENOXAPARIN SODIUM 30 MG: 100 INJECTION SUBCUTANEOUS at 01:26

## 2021-11-12 RX ADMIN — Medication 1 CAPSULE: at 10:52

## 2021-11-12 RX ADMIN — IOPAMIDOL 80 ML: 755 INJECTION, SOLUTION INTRAVENOUS at 05:29

## 2021-11-12 RX ADMIN — Medication 10 ML: at 14:42

## 2021-11-12 RX ADMIN — DEXAMETHASONE SODIUM PHOSPHATE 6 MG: 4 INJECTION, SOLUTION INTRAMUSCULAR; INTRAVENOUS at 00:36

## 2021-11-12 RX ADMIN — OXYCODONE HYDROCHLORIDE AND ACETAMINOPHEN 250 MG: 500 TABLET ORAL at 17:29

## 2021-11-12 RX ADMIN — CEFTRIAXONE 1 G: 1 INJECTION, POWDER, FOR SOLUTION INTRAMUSCULAR; INTRAVENOUS at 01:24

## 2021-11-12 RX ADMIN — DEXAMETHASONE 6 MG: 6 TABLET ORAL at 08:40

## 2021-11-12 RX ADMIN — Medication 1 CAPSULE: at 08:40

## 2021-11-12 RX ADMIN — PANTOPRAZOLE SODIUM 40 MG: 40 TABLET, DELAYED RELEASE ORAL at 08:40

## 2021-11-12 RX ADMIN — BUDESONIDE AND FORMOTEROL FUMARATE DIHYDRATE 2 PUFF: 80; 4.5 AEROSOL RESPIRATORY (INHALATION) at 14:44

## 2021-11-12 RX ADMIN — ACETAMINOPHEN 650 MG: 325 TABLET ORAL at 01:25

## 2021-11-12 NOTE — ED PROVIDER NOTES
Derick Abrams is a 64 y.o. male with no pertinent past medical history presenting with worsening shortness of breath, states for the past 24 hours he has had severe dyspnea at rest and even worse when he attempts to ambulate. He also is complaining of severe generalized fatigue. He was tested positive for Covid recently in the ED. He denies hemoptysis, pleurisy. He is no longer experiencing fever. He has had no leg swelling recently, abdominal distention or facial edema. No chest pain. No past medical history on file. No past surgical history on file. No family history on file. Social History     Socioeconomic History    Marital status:      Spouse name: Not on file    Number of children: Not on file    Years of education: Not on file    Highest education level: Not on file   Occupational History    Not on file   Tobacco Use    Smoking status: Never Smoker    Smokeless tobacco: Never Used   Substance and Sexual Activity    Alcohol use: Not on file    Drug use: Not on file    Sexual activity: Not on file   Other Topics Concern    Not on file   Social History Narrative    Not on file     Social Determinants of Health     Financial Resource Strain:     Difficulty of Paying Living Expenses: Not on file   Food Insecurity:     Worried About Running Out of Food in the Last Year: Not on file    Bree of Food in the Last Year: Not on file   Transportation Needs:     Lack of Transportation (Medical): Not on file    Lack of Transportation (Non-Medical):  Not on file   Physical Activity:     Days of Exercise per Week: Not on file    Minutes of Exercise per Session: Not on file   Stress:     Feeling of Stress : Not on file   Social Connections:     Frequency of Communication with Friends and Family: Not on file    Frequency of Social Gatherings with Friends and Family: Not on file    Attends Christian Services: Not on file    Active Member of Clubs or Organizations: Not on file    Attends Club or Organization Meetings: Not on file    Marital Status: Not on file   Intimate Partner Violence:     Fear of Current or Ex-Partner: Not on file    Emotionally Abused: Not on file    Physically Abused: Not on file    Sexually Abused: Not on file   Housing Stability:     Unable to Pay for Housing in the Last Year: Not on file    Number of [place] in the Last Year: Not on file    Unstable Housing in the Last Year: Not on file         ALLERGIES: Patient has no known allergies. Review of Systems   Constitutional: Positive for chills and fatigue. Negative for fever. HENT: Negative for ear pain, sore throat and trouble swallowing. Eyes: Negative for visual disturbance. Respiratory: Positive for cough and shortness of breath. Cardiovascular: Negative for chest pain, palpitations and leg swelling. Gastrointestinal: Negative for abdominal pain. Genitourinary: Negative for dysuria. Musculoskeletal: Negative for back pain and gait problem. Skin: Negative for rash. Neurological: Positive for headaches. Negative for light-headedness. Psychiatric/Behavioral: Negative for confusion. All other systems reviewed and are negative. Vitals:    11/11/21 2144 11/11/21 2145   BP: 131/85    Pulse: (!) 121    Resp: 30    Temp: 97.5 °F (36.4 °C)    SpO2: (!) 88% 92%   Weight: 86.6 kg (191 lb)    Height: 5' 9\" (1.753 m)             Physical Exam  Constitutional:       General: He is not in acute distress. Appearance: He is not toxic-appearing. HENT:      Head: Normocephalic and atraumatic. Mouth/Throat:      Mouth: Mucous membranes are moist.   Eyes:      Extraocular Movements: Extraocular movements intact. Cardiovascular:      Rate and Rhythm: Regular rhythm. Tachycardia present. Heart sounds: Normal heart sounds. Pulmonary:      Effort: Pulmonary effort is normal. Tachypnea present. No respiratory distress. Breath sounds: Rales present.    Chest: Chest wall: No mass or tenderness. Abdominal:      Palpations: Abdomen is soft. Tenderness: There is no abdominal tenderness. Musculoskeletal:      Cervical back: Normal range of motion. Right lower leg: No edema. Left lower leg: No edema. Skin:     Capillary Refill: Capillary refill takes less than 2 seconds. Neurological:      General: No focal deficit present. Mental Status: He is alert and oriented to person, place, and time. Psychiatric:         Mood and Affect: Mood normal.          MDM       Procedures    MEDICAL DECISION MAKIN y.o. male presents with Shortness of Breath and Positive For Covid-19    Differential diagnosis includes but not limited to: Covid with associated hypoxemia-likely due to lower respiratory tract involvement, less likely PE, pleural effusion, pericardial effusion    LABORATORY TESTS:  Labs Reviewed   CRP, HIGH SENSITIVITY   FERRITIN   D DIMER   LD   PROCALCITONIN   CBC WITH AUTOMATED DIFF   METABOLIC PANEL, COMPREHENSIVE       IMAGING RESULTS:  XR CHEST PORT    (Results Pending)       MEDICATIONS GIVEN:  Medications - No data to display    PROGRESS NOTE:   10:35 PM Patient's symptoms have improved with supplemental oxygen. CONSULTS:  Hospitalist Consult: [address] [place] [address] for Admission  10:36 PM    ED Room Number: ER12/12  Patient Name and age:  Danial Marroquin 64 y.o.  male  Working Diagnosis:   1. Acute hypoxemic respiratory failure due to COVID-19 [facility])        COVID-19 Suspicion:  yes  Sepsis present:  no  Reassessment needed: no  Code Status:  Full Code  Readmission: no  Isolation Requirements:  yes  Recommended Level of Care:  med/surg  Department:[facility] ED - [phone]  Other: Recently diagnosed here with COVID-19, hypoxemic at rest to 87% to 88%, in the mid to low 80s with ambulation. Symptomatic from hypoxia. Mildly tachypneic but no distress. IMPRESSION:  1.  Acute hypoxemic respiratory failure due to COVID-19 [facility]) PLAN:  - Admit to hospitalist    Total critical care time spent exclusive of procedures:  36 minutes    Saurav Jj MD          Please note that this dictation was completed with [employer], the computer voice recognition software. Quite often unanticipated grammatical, syntax, homophones, and other interpretive errors are inadvertently transcribed by the computer software. Please disregard these errors. Please excuse any errors that have escaped final proofreading.

## 2021-11-12 NOTE — H&P
[facility] [place] [facility]  HISTORY AND PHYSICAL    Name:  Uday Kathleen  MR#:  [id number]  :  1960  ACCOUNT #:  [de-identified]  ADMIT DATE:  2021      The patient was seen, evaluated, and admitted by me on 2021. PRIMARY CARE PHYSICIAN:  Nathalia Flores    SOURCE OF INFORMATION:  The patient and review of ED electronic medical record. CHIEF COMPLAINT:  Shortness of breath. HISTORY OF PRESENT ILLNESS:  This is a 60-year-old man with no significant past medical history who was in his usual state of health until a few days ago when the patient developed body aches and pain as well as shortness of breath. The shortness of breath is associated with cough which is nonproductive. The patient was seen in the emergency room and tested positive for COVID. The patient came back to the emergency room because of worsening shortness of breath. The shortness of breath is worse with ambulation. The patient was found to have oxygen saturation of 88% at rest.  He was placed on supplemental oxygen in the emergency room and the oxygen level improved to 92%. The repeated chest x-ray of the patient shows evidence of multifocal pneumonia. The patient was referred to the hospitalist service for evaluation for admission. No prior history of respiratory disease. The patient stated that he is fully vaccinated against COVID-19 virus infection. No record of prior admission to this hospital.  The patient has no history of heart disease as well. PAST MEDICAL HISTORY:  Not significant. ALLERGIES:  NO KNOWN DRUG ALLERGIES. MEDICATIONS:  Albuterol 90 mcg 1 puff by inhalation every 4 hours as needed for wheezing. FAMILY HISTORY:   This was reviewed. Mother has lupus. PAST SURGICAL HISTORY:  Not significant. SOCIAL HISTORY:  No history of alcohol or tobacco abuse. REVIEW OF SYSTEMS:  HEAD, EYES, EARS, NOSE AND THROAT:  This is positive for headache.   No blurring of vision and no photophobia. RESPIRATORY SYSTEM:  This is positive for cough and shortness of breath. No hemoptysis. CARDIOVASCULAR SYSTEM:  No chest pain, no orthopnea, and no palpitation. GASTROINTESTINAL SYSTEM:  No nausea or vomiting, no diarrhea, and no constipation. GENITOURINARY SYSTEM:  No dysuria, no urgency, and no frequency. All other systems are reviewed and they are negative. PHYSICAL EXAMINATION:  GENERAL APPEARANCE:  The patient appeared ill and in moderate distress. VITAL SIGNS:  On arrival at the emergency room; temperature 97.5, pulse 121, respiratory rate 30, blood pressure 131/85, and oxygen saturation 88% on room air. HEAD:  Normocephalic, atraumatic. EYES:  Normal eye movement. No redness, no drainage, and no discharge. EARS:  Normal external ears with no obvious drainage. NOSE:  No deformity and no discharge. MOUTH AND THROAT:  No visible oral lesion. NECK:  Supple. No JVD. No thyromegaly. CHEST:  Few expiratory wheezing. No crackles. HEART:  Normal S1 and S2, regular. No clinically appreciable murmur. ABDOMEN:  Soft, nontender. Normal bowel sounds. CNS:  Alert and oriented x3. No gross focal neurological deficit. EXTREMITIES:  No edema. Pulses 2+ bilaterally. MUSCULOSKELETAL SYSTEM:  No obvious joint deformity or swelling. SKIN:  No active skin lesions seen in the exposed part of the body. PSYCHIATRY:  Normal mood and affect. LYMPHATIC SYSTEM:  No cervical lymphadenopathy. DIAGNOSTIC DATA:  Chest x-ray shows patchy consolidation in bilateral mid and lower lungs concerning for multifocal pneumonia, significantly progressed compared to prior result. LABORATORY DATA:  Chemistry; sodium 135, potassium 3.6, chloride 104, CO2 of 26, glucose 129, BUN 30, creatinine 1.25, calcium 8.5, total bilirubin 0.6, ALT 59, AST 70, alkaline phosphatase 56, total protein 6.7, albumin level 2.5, and globulin 4.2. High sensitivity troponin level 15. ASSESSMENT:  1.   Acute respiratory failure with hypoxia. 2.  COVID-19 virus infection. 3.  Bacterial pneumonia. 4.  Hyperglycemia. PLAN:  1. Acute respiratory failure with hypoxia. We will admit the patient for further evaluation and treatment. This is most likely due to COVID-19 virus infection and superimposed bacterial pneumonia. These conditions will be discussed below. We will continue with supplemental oxygen. We will check BNP level to determine if there is a cardiac component to the patient's shortness of breath. We will also check cardiac markers to rule out acute myocardial infarction as another possible cause of shortness of breath. We will obtain CTA of the chest to evaluate the patient for pulmonary embolism as a possible cause of shortness of breath as well. We will continue with supplemental oxygen. 2.  COVID-19 virus infection. This is the most likely cause of the patient's acute respiratory failure with hypoxia. We will start the patient on Decadron. The patient will require the evaluation for remdesivir therapy if indicated. We will continue supportive therapy. 3.  Bacterial pneumonia. This is superimposed on the COVID-19 virus infection. We will start the patient on Rocephin and doxycycline, and await result of CT scan of the chest.  4.  Hyperglycemia. The patient has no history of diabetes. We will check hemoglobin A1c level. 5.  Other issues. Code status, the patient is a full code. We will place the patient on Lovenox for DVT prophylaxis as per COVID-19 virus infection patient protocol. FUNCTIONAL STATUS PRIOR TO ADMISSION:  The patient came from home. The patient is ambulatory with no assistive device. COVID PRECAUTION:  The patient was wearing a face mask. I was wearing a gown, gloves, face shield, and an N-95 face mask for this patient's encounter. Daily Navarro MD      [id number]/  D:  11/12/2021 4:56  T:  11/12/2021 6:24  JOB #:  [id number]  CC:   Festus Quinteroma

## 2021-11-12 NOTE — ADT AUTH CERT NOTES
INPATIENT ADMISSION NOTIFICATION     ADMISSION DATE 2021    STILL IN HOUSE     UR CONTACT - Jj Meade   UR [phone]  UR PHONE [phone]    [place]!!     [address]     FACILITY NPI :[id number]  FACILITY TAX ID : [id number]     [facility]  [facility] EMERGENCY DEPT  [address]  [phone]            Patient Name :Derick Abrams   : 1960 (61 yrs)  MRN : [id number]     Patient Mailing Address [address] [place] [47] , [address]                                                             .         Insurance Plan Payor: SHAQUILLE Coker  Primary Coverage Subscriber ID : [id number]          Current Patient Class : INPATIENT  Admit Date : 2021     REQUESTED LEVEL OF CARE: INPATIENT [101]                                                           Diagnosis : Acute respiratory failure with hypoxia ([address]                          ICD10 Code : Acute respiratory failure with hypoxia ([facility] [address] [J96.01]          Admitting and Attending Info:  Admitting Provider : Mark Michelle MD   NPI: [id number]  Admitting Provider Phone. [phone]  Admitting Provider Address:  SAME AS FACILITY      Patient Demographics    Patient Name   Maira Mcgowan Legal Sex   Male    1960 Address   [address] [place] [address] Phone   [phone] [facility] Account    Name Acct ID Class Status Primary Coverage   Maiar Mcgowan [id number] [id number]            Guarantor Account (for Hospital Account [de-identified])    Name Relation to Pt Service Area Active?  Acct Type   Maira Mcgowan Self [facility] Yes Personal/Family   Address Phone     [address] [place], [place] [address] [phone](D)              Coverage Information (for Hospital Account [de-identified])    F/O Payor/Plan Subscriber  Subscriber Sex Precert #   SHAQUILLE HARRINGTON/COLTON HARRINGTON [facility] 60     Subscriber Subscriber #   Carolann Moffett [id number]   [facility] # Group Name   Shamir GOV'T   Address Phone   [address] [facility]    Policy Number Status Effective Date Benefits Phone   [id number] -  -   Auth/Cert               Diagnosis     Codes Comments   Acute hypoxemic respiratory failure due to COVID-19 [facility])  ICD-10-CM: U07.1, J96.01   ICD-9-CM: 518.81, 079.89, 799.02             Admission Information    Arrival Date/Time: 2021 Admit Date/Time: 2021 IP Adm.  Date/Time: 2021   Admission Type: Emergency Point of Origin: Non-health Care Facility/self Admit Category:    Means of Arrival: Car Primary Service: Medicine Secondary Service: N/A   Transfer Source:  Service Area: [facility] Unit: [facility] EMERGENCY DEPT   Admit Provider: Sandro Pike MD Attending Provider: Kira Tilley MD Referring Provider:      Admission Information    Attending Provider Admission Dx Admitted on   Fran Pierce MD Acute respiratory failure with hypoxia ([facility] [address] 21   Service Isolation Code Status   Medicine Droplet Plus, Droplet Plus Full Code   Allergies Advance Care Planning    No Known Allergies Jump to the Activity       Admission Information    Unit/Bed: [facility] EMERGENCY DEPT/12 Service: Medicine   Admitting provider: Sandro Pike MD Phone: [phone]   Attending provider: Fran Pierce MD Phone: [phone]   PCP: Nathalia King Phone: [phone]   Admission dx:  Patient class: I   Admission type: ER       Patient Demographics    Patient Name   Davonte Winter   [id number] Legal Sex   Male    1960 Address   [address] [place] [address] Phone   [phone] (Mobile)     H&P Notes       H&P filed by Sandro Pike MD at 21 9363 / Draft: Not Electronically Signed / documented on ED from 2021 in [facility] EMERGENCY DEPT    Author: [facility], Jayro Rodriguez MD Author Type: Physician Filed: 21 [id number]   Completion Status: In Progress Availability: Unavailable Document ID: [id number]   Note Status: Unsigned Transcription Date of Service: 21     : Stephanie Gibson MD (Physician)          Transcription Details: H&P   Dictated By: Stephanie Gibson MD Dictated Date: Not found Dictated Time: Not found   Transcribed By: Not found Transcribed Date: 21 Transcribed Time: 3003 [facility]  HISTORY AND PHYSICAL     Name:  Padmini Ibanez  MR#:  [id number]  :  1960  ACCOUNT #:  [de-identified]  ADMIT DATE:  2021        The patient was seen, evaluated, and admitted by me on 2021.     PRIMARY CARE PHYSICIAN:  KELSIE Figueroa     SOURCE OF INFORMATION:  The patient and review of ED electronic medical record.     CHIEF COMPLAINT:  Shortness of breath.     HISTORY OF PRESENT ILLNESS:  This is a 70-year-old man with no significant past medical history who was in his usual state of health until a few days ago when the patient developed body aches and pain as well as shortness of breath. The shortness of breath is associated with cough which is nonproductive. The patient was seen in the emergency room and tested positive for COVID. The patient came back to the emergency room because of worsening shortness of breath. The shortness of breath is worse with ambulation. The patient was found to have oxygen saturation of 88% at rest.  He was placed on supplemental oxygen in the emergency room and the oxygen level improved to 92%. The repeated chest x-ray of the patient shows evidence of multifocal pneumonia. The patient was referred to the hospitalist service for evaluation for admission. No prior history of respiratory disease. The patient stated that he is fully vaccinated against COVID-19 virus infection.   No record of prior admission to this hospital.  The patient has no history of heart disease as well.     PAST MEDICAL HISTORY:  Not significant.     ALLERGIES:  NO KNOWN DRUG ALLERGIES.     MEDICATIONS:  Albuterol 90 mcg 1 puff by inhalation every 4 hours as needed for wheezing.     FAMILY HISTORY:   This was reviewed. Mother has lupus.     PAST SURGICAL HISTORY:  Not significant.     SOCIAL HISTORY:  No history of alcohol or tobacco abuse.     REVIEW OF SYSTEMS:  HEAD, EYES, EARS, NOSE AND THROAT:  This is positive for headache. No blurring of vision and no photophobia. RESPIRATORY SYSTEM:  This is positive for cough and shortness of breath. No hemoptysis. CARDIOVASCULAR SYSTEM:  No chest pain, no orthopnea, and no palpitation. GASTROINTESTINAL SYSTEM:  No nausea or vomiting, no diarrhea, and no constipation. GENITOURINARY SYSTEM:  No dysuria, no urgency, and no frequency.     All other systems are reviewed and they are negative.     PHYSICAL EXAMINATION:  GENERAL APPEARANCE:  The patient appeared ill and in moderate distress. VITAL SIGNS:  On arrival at the emergency room; temperature 97.5, pulse 121, respiratory rate 30, blood pressure 131/85, and oxygen saturation 88% on room air. HEAD:  Normocephalic, atraumatic. EYES:  Normal eye movement. No redness, no drainage, and no discharge. EARS:  Normal external ears with no obvious drainage. NOSE:  No deformity and no discharge. MOUTH AND THROAT:  No visible oral lesion. NECK:  Supple. No JVD. No thyromegaly. CHEST:  Few expiratory wheezing. No crackles. HEART:  Normal S1 and S2, regular. No clinically appreciable murmur. ABDOMEN:  Soft, nontender. Normal bowel sounds. CNS:  Alert and oriented x3. No gross focal neurological deficit. EXTREMITIES:  No edema. Pulses 2+ bilaterally. MUSCULOSKELETAL SYSTEM:  No obvious joint deformity or swelling. SKIN:  No active skin lesions seen in the exposed part of the body. PSYCHIATRY:  Normal mood and affect.   LYMPHATIC SYSTEM:  No cervical lymphadenopathy.     DIAGNOSTIC DATA:  Chest x-ray shows patchy consolidation in bilateral mid and lower lungs concerning for multifocal pneumonia, significantly progressed compared to prior result.     LABORATORY DATA:  Chemistry; sodium 135, potassium 3.6, chloride 104, CO2 of 26, glucose 129, BUN 30, creatinine 1.25, calcium 8.5, total bilirubin 0.6, ALT 59, AST 70, alkaline phosphatase 56, total protein 6.7, albumin level 2.5, and globulin 4.2. High sensitivity troponin level 15.     ASSESSMENT:  1. Acute respiratory failure with hypoxia. 2.  COVID-19 virus infection. 3.  Bacterial pneumonia. 4.  Hyperglycemia.     PLAN:  1. Acute respiratory failure with hypoxia. We will admit the patient for further evaluation and treatment. This is most likely due to COVID-19 virus infection and superimposed bacterial pneumonia. These conditions will be discussed below. We will continue with supplemental oxygen. We will check BNP level to determine if there is a cardiac component to the patient's shortness of breath. We will also check cardiac markers to rule out acute myocardial infarction as another possible cause of shortness of breath. We will obtain CTA of the chest to evaluate the patient for pulmonary embolism as a possible cause of shortness of breath as well. We will continue with supplemental oxygen. 2.  COVID-19 virus infection. This is the most likely cause of the patient's acute respiratory failure with hypoxia. We will start the patient on Decadron. The patient will require the evaluation for remdesivir therapy if indicated. We will continue supportive therapy. 3.  Bacterial pneumonia. This is superimposed on the COVID-19 virus infection. We will start the patient on Rocephin and doxycycline, and await result of CT scan of the chest.  4.  Hyperglycemia. The patient has no history of diabetes. We will check hemoglobin A1c level. 5.  Other issues. Code status, the patient is a full code.   We will place the patient on Lovenox for DVT prophylaxis as per COVID-19 virus infection patient protocol.     FUNCTIONAL STATUS PRIOR TO ADMISSION:  The patient came from home. The patient is ambulatory with no assistive device.     COVID PRECAUTION:  The patient was wearing a face mask. I was wearing a gown, gloves, face shield, and an N-95 face mask for this patient's encounter.        Miley Hawthorne MD        [id number]/  D:  2021 4:56  T:  2021 6:24  JOB #:  [id number]  CC:   Gavin Acevedo, [address]                H&P by Bala De León MD at 21 documented on ED from 2021 in [facility] EMERGENCY [facility]PT    Author: Bala De León MD Author Type: Physician Filed: 21   Note Status: Signed Cosign: Cosign Not Required Date of Service: 21   : Bala De León MD (Physician)                  H&P dictated [id number]            Patient Demographics    Patient Name   Nancy Toscano   [id number] Legal Sex   Male    1960 Address   [address] [place] [address] [address] [place] [address] Phone   [phone] (Mobile)   CSN:   [id number]     Admit Date: Admit Time Room Bed   2021  9:58 PM WE31 [69417] 12 [87199]       Attending Providers    Provider Pager From To   María Rodriguez MD  21   Bala De León MD  21   Meseret Dover MD  21   Bala De León MD  21   Meseret Dover MD  21      Emergency Contact(s)    Name Relation Home Work Mobile   Chantale Spouse   [phone]     Utilization Reviews         Viral Illness, Acute - Care Day 2 (2021) by Tommy Amezuca       Review Entered Review Status   2021 13:41 Completed      Criteria Review      Care Day: 2 Care Date: 2021 Level of Care: Telemetry    Guideline Day 2    Level Of Care    (X) Floor    Clinical Status    (X) * Hypotension absent    2021 13:41:06 EST by Tommy Amezcua      98/72, 86, 22, 102.6    (X) * No requirement for mechanical ventilation    ( ) * Oxygenation at baseline or improved    11/12/2021 13:41:06 EST by Helen Hernández      88% SAT ON O2 5LPM - O2 10LPM HFNC 92%    (X) * Mental status at baseline    Routes    (X) * Oral hydration    (X) Oral or IV medications    11/12/2021 13:41:06 EST by Helen Hernández      TYLENOL 650MG PO X1, DECADRON 6MG PO DAILY, ROCEPHIN 1G IV DAILY, DOXYCYCLINE 100MG IV Q12HR    (X) Usual diet    Interventions    (X) Possible isolation    (X) Pulse oximetry    (X) Possible oxygen    Medications    (X) Possible antibiotic (eg, for bacterial coinfection or superinfection)    (X) Possible DVT prophylaxis    11/12/2021 13:41:06 EST by Camden Banda    * Milestone   Additional Notes   11/12         ATTENDING-   Assessment and Plan:       Pneumonia due to COVID-19 virus - POA.  Moderate case so far.  Start decadron, baricitinib, vit C, Zn, lovenox.  Monitor DDimer and CRP       Acute respiratory failure with hypoxia -  POA due to COVID.  Continue oxygen as needed.  Currently on 4L.  Consult pulmonary if worsening. He has not seen pulmonary in past, though he claims to have COPD.       Sepsis / Fever / Sinus tachycardia / Bandemia - POA, presumed due to COVID.  Moderate procalcitonin.  Completed Z pack.  Negative strep testing.  CT chest  with viral patchy pattern.  Check MRSA screen.  Currently does not warrant antibiotics.         Subjective:   Chief Complaint: cough and dyspnea.             CT CHEST   IMPRESSION       1. No acute pulmonary embolus.    2. Patchy bilateral airspace disease.                11/12/2021 00:06   WBC: 8.1   NRBC: 0.0   RBC: 5.60   HGB: 15.2   HCT: 44.9   MCV: 80.2   MCH: 27.1   MCHC: 33.9   RDW: 15.9 (H)   PLATELET: 172 (L)      11/12/2021 00:06   Sodium: 135 (L)   Potassium: 3.6   Chloride: 104   CO2: 26   Anion gap: 5   Glucose: 129 (H)   BUN: 30 (H)   Creatinine: 1.25   BUN/Creatinine ratio: 24 (H)   Calcium: 8.5   GFR est non-AA: 59 (L)   GFR est AA: >60 Bilirubin, total: 0.6   Protein, total: 6.7   Albumin: 2.5 (L)   Globulin: 4.2 (H)   A-G Ratio: 0.6 (L)   ALT: 59   AST: 70 (H)   Alk.  phosphatase: 56   LD: 652 (H)   Procalcitonin: 1.34   Troponin-High Sensitivity: 15   Ferritin: 7,750 (H)   CRP, High sensitivity: >9.5      11/12/2021 04:34   Phosphorus: 4.5   Magnesium: 2.4   Hemoglobin A1c, (calculated): 5.6   Est. average glucose: 114                 Viral Illness, Acute - Care Day 1 (11/11/2021) by Nida Carlson       Review Entered Review Status   11/12/2021 10:40 Completed      Criteria Review      Care Day: 1 Care Date: 11/11/2021 Level of Care: Inpatient Floor    Guideline Day 1    Level Of Care    (X) ICU or floor    11/12/2021 10:40:54 EST by Nida Carlson      121, 131/85    Clinical Status    (X) * Clinical Indications met    ( ) Possible Fever    11/12/2021 10:40:54 EST by Nida Carlson      97.5    (X) Possible Tachypnea    11/12/2021 10:40:54 EST by Nida Carlson      30    (X) Possible Hypoxemia    11/12/2021 10:40:54 EST by Nida Carlson      88% ROOM AIR    Routes    (X) Oral or IV hydration    (X) Liquid or usual diet    11/12/2021 10:40:54 EST by Leticia Monroe DIET    Interventions    (X) Possible isolation    11/12/2021 10:40:54 EST by Nida Carlson      DROPLET    (X) CBC with differential, chemistries, renal and hepatic function testing, C-reactive protein, coagulation panel    (X) ABG or oximetry    (X) Possible oxygen    11/12/2021 10:40:54 EST by Nida Carlson      O2 2LPM - 92%    (X) Possible chest x-ray    Medications    (X) Possible antibiotic (eg, for bacterial coinfection or superinfection)    * Milestone   Additional Notes   11/11/21      PURA-   Carroll Motta is a 64 y.o. male with no pertinent past medical history presenting with worsening shortness of breath, states for the past 24 hours he has had severe dyspnea at rest and even worse when he attempts to ambulate. Deirdre Hanks also is complaining of severe generalized fatigue.  He was tested positive for Covid recently in the ED. Lauraine Holter denies hemoptysis, pleurisy. Lauraine Holter is no longer experiencing fever.  He has had no leg swelling recently, abdominal distention or facial edema.  No chest pain. Physical Exam   Constitutional:        General: He is not in acute distress.      Appearance: He is not toxic-appearing. HENT:       Head: Normocephalic and atraumatic.       Mouth/Throat:       Mouth: Mucous membranes are moist.    Eyes:       Extraocular Movements: Extraocular movements intact. Cardiovascular:       Rate and Rhythm: Regular rhythm. Tachycardia present.       Heart sounds: Normal heart sounds. Pulmonary:       Effort: Pulmonary effort is normal. Tachypnea present. No respiratory distress.       Breath sounds: Rales present. Chest:       Chest wall: No mass or tenderness. Abdominal:       Palpations: Abdomen is soft.       Tenderness: There is no abdominal tenderness. Musculoskeletal:       Cervical back: Normal range of motion.       Right lower leg: No edema.       Left lower leg: No edema. Skin:      Capillary Refill: Capillary refill takes less than 2 seconds. Neurological:       General: No focal deficit present.       Mental Status: He is alert and oriented to person, place, and time. Psychiatric:          Mood and Affect: Mood normal.       Recently diagnosed here with COVID-19, hypoxemic at rest to 87% to 88%, in the mid to low 80s with ambulation.  Symptomatic from hypoxia.  Mildly tachypneic but no distress.           IMPRESSION:   1.  Acute hypoxemic respiratory failure due to COVID-19 [facility])            PLAN:   - Admit to hospitalist                  H&P   CHIEF COMPLAINT:  Shortness of breath.       HISTORY OF PRESENT ILLNESS:  This is a 66-year-old man with no significant past medical history who was in his usual state of health until a few days ago when the patient developed body aches and pain as well as shortness of breath.  The shortness of breath is associated with cough which is nonproductive.  The patient was seen in the emergency room and tested positive for COVID.  The patient came back to the emergency room because of worsening shortness of breath.  The shortness of breath is worse with ambulation.  The patient was found to have oxygen saturation of 88% at rest. [facility] was placed on supplemental oxygen in the emergency room and the oxygen level improved to 92%.  The repeated chest x-ray of the patient shows evidence of multifocal pneumonia.  The patient was referred to the hospitalist service for evaluation for admission.  No prior history of respiratory disease.  The patient stated that he is fully vaccinated against COVID-19 virus infection.  No record of prior admission to this hospital.  The patient has no history of heart disease as well.       ASSESSMENT:   1.  Acute respiratory failure with hypoxia. 2.  COVID-19 virus infection. 3.  Bacterial pneumonia. 4.  Hyperglycemia.       PLAN:   1.   Acute respiratory failure with hypoxia.  We will admit the patient for further evaluation and treatment.  This is most likely due to COVID-19 virus infection and superimposed bacterial pneumonia.  These conditions will be discussed below.  We will continue with supplemental oxygen.  We will check BNP level to determine if there is a cardiac component to the patient's shortness of breath.  We will also check cardiac markers to rule out acute myocardial infarction as another possible cause of shortness of breath.  We will obtain CTA of the chest to evaluate the patient for pulmonary embolism as a possible cause of shortness of breath as well.  We will continue with supplemental oxygen.    2.  COVID-19 virus infection.  This is the most likely cause of the patient's acute respiratory failure with hypoxia.  We will start the patient on Decadron.  The patient will require the evaluation for remdesivir therapy if indicated. Balwinder Hart will continue supportive therapy. 3.  Bacterial pneumonia.  This is superimposed on the COVID-19 virus infection.  We will start the patient on Rocephin and doxycycline, and await result of CT scan of the chest.   4.  Hyperglycemia.  The patient has no history of diabetes.  We will check hemoglobin A1c level. 5.  Other issues.  Code status, the patient is a full code. Everette Helms will place the patient on Lovenox for DVT prophylaxis as per COVID-19 virus infection patient protocol.                   EXAM:  XR CHEST PORT       INDICATION: Shortness of breath       COMPARISON: Chest radiograph 11/7/2021       TECHNIQUE: Upright portable chest AP view       FINDINGS:        Patchy consolidations in bilateral mid and lower lungs concerning for multifocal   pneumonia, significantly progressed compared to prior. Cardiomediastinal   silhouette within normal limits.  No definite pleural effusion or pneumothorax       IMPRESSION       Patchy consolidations in bilateral mid and lower lungs concerning for multifocal   pneumonia, significantly progressed compared to prior.                         Viral Illness, Acute - Clinical Indications for Admission to Inpatient Care by Vicky Sorto       Review Entered Review Status   11/12/2021 10:38 Completed      Criteria Review      Clinical Indications for Admission to Inpatient Care    Most Recent : Vicky Sorto Most Recent Date: 11/12/2021 10:38:38 EST    (X) Admission is indicated for  1 or more  of the following  [A] [B] (1) (2) (3) (4) (5) (6) (7)    (8) (9):       (X) Pulmonary manifestation, as indicated by  1 or more  of the following :          (X) Hypoxemia          11/12/2021 10:38:38 EST by Vicky Sorto            sats 88% room air, - o2 2lpm          (X) Severe tachypnea (eg, respiratory rate greater than 24 breaths per minute [C]) (13)          11/12/2021 10:38:38 EST by Vicky Sorto            resp 30       (X) Systemic [A] manifestation, as indicated by  1 or more  of the following :          (X) Hemodynamic instability          11/12/2021 10:38:38 EST by Kevin Callahan                   (X) Isolation indicated that cannot be performed outside hospital setting [D] [E]

## 2021-11-12 NOTE — PROGRESS NOTES
Dr. Abelino Canales notified of patient's increased oxygen demand. Patient is now on 10 L Medium Flow.

## 2021-11-12 NOTE — PROGRESS NOTES
Ambulatory Care Management Note    Date/Time:  11/12/2021 9:36 AM     Pt lt a vm last night for ACM stating his POx was running 88% and wanted to know what he should do next. ACM called pt this morning to find he ultimately went to the ED yesterday evening per ACM's instruction at initial call \"Pt instructed to seek care at ED if POx hits 89% or lower. \"  Pt is currently admitted to [facility] and reports he is feeling better now after receiving tx's and supplemental O2. Pt was informed that he can con't to call to this ACM if any questions or concerns arise during his stay and thereafter.   Pt verbalized understanding.  Albino Ramos

## 2021-11-12 NOTE — ED TRIAGE NOTES
Pt arrives to ED for SOB. Pt covid positive with CXR showing PNA here on 11/7. O2 sats at rest 88%.  Pt placed on 3L NC and is up to 92%

## 2021-11-12 NOTE — CONSULTS
[facility]     Name: Dominique Jones MRN: [id number]   : 1960 Hospital: [address]   Date: 2021        Impression Plan   1. Acute respiratory failure  2. Hypoxia  3. COVID 19 PNA               · Wean O2 to keep sats above 90%  · Continue dexamethasone- Increase to 10 mg daily   · Cont Baricitinib  · Pt instructed to self prone 3 hrs a night  · OOB as much as possible  · Follow d-dimer  · enox 30 mg q12h  · Will see as needed over the weekend         Addendum 21 4:10 pm: Updated wife Florentino Duran over the phone   Radiology  ( personally reviewed) CTA chest: bilateral infiltrates, no PE   ABG No results for input(s): PHI, PO2I, PCO2I in the last 72 hours. Subjective     Cc: shortness of breath    63 yo with no PMHx presenting with increasing shortness of breath and weakness. COVID 19 positive. Pt states that he has been sick since 11/3. Pt had J&J vaccine 10/25/21. Non-smoker. No underlying lung problems. Currently on 10 L O2    Review of Systems:  A comprehensive review of systems was negative except for that written in the HPI. No past medical history on file. No past surgical history on file. Prior to Admission medications    Medication Sig Start Date End Date Taking? Authorizing Provider   albuterol (ProAir HFA) 90 mcg/actuation inhaler Take 1 Puff by inhalation every four (4) hours as needed for Wheezing.  21  Yes Sherri GALLAGHER NP     Current Facility-Administered Medications   Medication Dose Route Frequency    sodium chloride (NS) flush 5-40 mL  5-40 mL IntraVENous Q8H    L.acidophilus-paracasei-S.thermophil-bifidobacter (RISAQUAD) 8 billion cell capsule  1 Capsule Oral DAILY    enoxaparin (LOVENOX) injection 30 mg  30 mg SubCUTAneous Q12H    dexAMETHasone (DECADRON) tablet 6 mg  6 mg Oral DAILY    pantoprazole (PROTONIX) tablet 40 mg  40 mg Oral ACB    ascorbic acid (vitamin C) (VITAMIN C) tablet 250 mg  250 mg Oral BID    zinc sulfate (ZINCATE) 50 mg zinc (220 mg) capsule 1 Capsule  1 Capsule Oral DAILY    baricitinib (OLUMIANT) tablet 4 mg  4 mg Oral DAILY    budesonide-formoterol (SYMBICORT) 80-4.5 mcg inhaler  2 Puff Inhalation BID RT     No Known Allergies   Social History     Tobacco Use    Smoking status: Never Smoker    Smokeless tobacco: Never Used   Substance Use Topics    Alcohol use: Not on file      No family history on file. Laboratory: I have personally reviewed the critical care flowsheet and labs.      Recent Labs     11/12/21  0006   WBC 8.1   HGB 15.2   HCT 44.9   *     Recent Labs     11/12/21  0434 11/12/21  0006   NA  --  135*   K  --  3.6   CL  --  104   CO2  --  26   GLU  --  129*   BUN  --  30*   CREA  --  1.25   CA  --  8.5   MG 2.4  --    PHOS 4.5  --    ALB  --  2.5*   ALT  --  59       Objective:     Mode Rate Tidal Volume Pressure FiO2 PEEP                    Vital Signs:     TMAX(24)      Intake/Output:   Last shift:         Last 3 shifts: 11/12 0701 - 11/12 1900  In: 360 [P.O.:360]  Out: - RRIOLAST3    Intake/Output Summary (Last 24 hours) at 11/12/2021 1553  Last data filed at 11/12/2021 1253  Gross per 24 hour   Intake 360 ml   Output    Net 360 ml     EXAM:   GENERAL: awake, alert, HEENT:  PERRL, EOMI, no alar flaring or epistaxis, oral mucosa moist without cyanosis, NECK:  no jugular vein distention, no retractions, no thyromegaly or masses, LUNGS: rhonci, no wheezes , HEART:  Regular rate and rhythm with no MGR; no edema is present, ABDOMEN:  soft with no tenderness, bowel sounds present, EXTREMITIES:  warm with no cyanosis, SKIN:  no jaundice or ecchymosis and NEUROLOGIC:  alert and oriented, grossly non-focal    Noel Patterson MD  [facility]

## 2021-11-12 NOTE — PROGRESS NOTES
11/12/2021  1:50 PM  Case management note    Reason for Admission:  Acute respiratory failure  Patient came to hospital for cough and SOB. Patient is , independent with ADL's and drives. Patient has no significant medical history  Jaskaran Mendoza                   RUR Score:          10%           Plan for utilizing home health:          PCP: First and Last name:  Nathalia Rees     Name of Practice:    Are you a current patient: Yes/No: yes   Approximate date of last visit: 1 day   Can you participate in a virtual visit with your PCP:                     Current Advanced Directive/Advance Care Plan: Full Code NO AD      Healthcare Decision Maker:   Heath Mera spouse                              Transition of Care Plan:                      1. Home with family assistance  2. PCP follow up  3. AD planning  4. CM to follow for discharge needs.     Care Management Interventions  Mode of Transport at Discharge: Self  Support Systems: Spouse/Significant Other  The Plan for Transition of Care is Related to the Following Treatment Goals : acute respiratory failure  Discharge Location  Discharge Placement: Home with family assistance  Kris Valdez

## 2021-11-12 NOTE — PROGRESS NOTES
[facility]    Medical Progress Note      NAME: Bala Juárez   :  1960  MRM:  [id number]    Date/Time of service 2021  7:43 AM          Assessment and Plan:     Pneumonia due to COVID-19 virus - POA. Moderate case so far. Start decadron, baricitinib, vit C, Zn, lovenox. Monitor DDimer and CRP. Acute respiratory failure with hypoxia -  POA due to COVID. Continue oxygen as needed. Currently on 4L. Consult pulmonary if worsening. He has not seen pulmonary in past, though he claims to have COPD. Sepsis / Fever / Sinus tachycardia / Bandemia - POA, presumed due to COVID. Moderate procalcitonin. Completed Z pack. Negative strep testing. CT chest  with viral patchy pattern. Check MRSA screen. Currently does not warrant antibiotics. Subjective:     Chief Complaint: cough and dyspnea. ROS:  (bold if positive, if negative)    CoughSOB/DOETolerating some PT  Tolerating Diet        Objective:     Last 24hrs VS reviewed since prior progress note.  Most recent are:    Visit Vitals  /63   Pulse 83   Temp 98.4 °F (36.9 °C)   Resp (!) 31   Ht 5' 9\" (1.753 m)   Wt 86.6 kg (191 lb)   SpO2 92%   BMI 28.21 kg/m²     SpO2 Readings from Last 6 Encounters:   21 92%   21 97%   21 96%   10/08/20 97%    O2 Flow Rate (L/min): 4 l/min   No intake or output data in the 24 hours ending 21 0743     Physical Exam:    Gen:  Well-developed, well-nourished, in no acute distress  HEENT:  Pink conjunctivae, PERRL, hearing intact to voice, moist mucous membranes  Neck:  Supple, without masses, thyroid non-tender  Resp:  No accessory muscle use, clear breath sounds without wheezes rales or rhonchi  Card:  No murmurs, normal S1, S2 without thrills, bruits or peripheral edema  Abd:  Soft, non-tender, non-distended, normoactive bowel sounds are present, no mass  Lymph:  No cervical or inguinal adenopathy  Musc:  No cyanosis or clubbing  Skin:  No rashes or ulcers, skin turgor is good  Neuro:  Cranial nerves are grossly intact, no focal motor weakness, follows commands appropriately  Psych:  Good insight, oriented to person, place and time, alert    Telemetry reviewed:   normal sinus rhythm  __________________________________________________________________  Medications Reviewed: (see below)  Medications:     Current Facility-Administered Medications   Medication Dose Route Frequency    albuterol (PROVENTIL HFA, VENTOLIN HFA, PROAIR HFA) inhaler 1 Puff  1 Puff Inhalation Q4H PRN    sodium chloride (NS) flush 5-40 mL  5-40 mL IntraVENous Q8H    sodium chloride (NS) flush 5-40 mL  5-40 mL IntraVENous PRN    acetaminophen (TYLENOL) tablet 650 mg  650 mg Oral Q6H PRN    polyethylene glycol (MIRALAX) packet 17 g  17 g Oral DAILY PRN    L.acidophilus-paracasei-S.thermophil-bifidobacter (RISAQUAD) 8 billion cell capsule  1 Capsule Oral DAILY    enoxaparin (LOVENOX) injection 30 mg  30 mg SubCUTAneous Q12H    dexAMETHasone (DECADRON) tablet 6 mg  6 mg Oral DAILY    guaiFENesin-dextromethorphan (ROBITUSSIN DM) 100-10 mg/5 mL syrup 5 mL  5 mL Oral Q4H PRN    ondansetron (ZOFRAN ODT) tablet 4 mg  4 mg Oral Q8H PRN     Current Outpatient Medications   Medication Sig    albuterol (ProAir HFA) 90 mcg/actuation inhaler Take 1 Puff by inhalation every four (4) hours as needed for Wheezing. Lab Data Reviewed: (see below)  Lab Review:     Recent Labs     11/12/21  0006   WBC 8.1   HGB 15.2   HCT 44.9   *     Recent Labs     11/12/21  0434 11/12/21  0006   NA  --  135*   K  --  3.6   CL  --  104   CO2  --  26   GLU  --  129*   BUN  --  30*   CREA  --  1.25   CA  --  8.5   MG 2.4  --    PHOS 4.5  --    ALB  --  2.5*   TBILI  --  0.6   ALT  --  59     No results found for: GLUCPOC  No results for input(s): PH, PCO2, PO2, HCO3, FIO2 in the last 72 hours. No results for input(s): INR, INREXT in the last 72 hours.   All Micro Results     None          Other pertinent lab: none    Total time spent with patient: 30 Minutes I personally reviewed chart, notes, data and current medications in the medical record. I have personally examined and treated the patient at bedside during this period.                  Care Plan discussed with: Patient, Nursing Staff and >50% of time spent in counseling and coordination of care    Discussed:  Care Plan and D/C Planning    Prophylaxis:  H2B/PPI    Disposition:  Home w/Family           ___________________________________________________    Attending Physician: Cyndee Haas MD

## 2021-11-12 NOTE — PROGRESS NOTES
[facility]: MED RECONCILIATION    Comments/Recommendations:   Patient confirmed he does not take any Rx medications on a scheduled basis. Patient completed 5 day z-pack today prior to hospital arrival   Updated preferred pharmacy to [facility] [place]. Medications added:     none    Medications removed:     Zpack-completed course     Medications adjusted:    none    Information obtained from: patient, Rx query    Allergies: Patient has no known allergies. Prior to Admission Medications:     Medication Documentation Review Audit       Reviewed by Claire Hastings (Pharmacist) on 11/11/21 at 2300      Medication Sig Documenting Provider Last Dose Status Taking? albuterol (ProAir HFA) 90 mcg/actuation inhaler Take 1 Puff by inhalation every four (4) hours as needed for Wheezing.  Sung Vázquez NP  Active Yes                    Thank you,   Vera Banks, PharmD, BCPS   Contact: [phone]

## 2021-11-13 LAB
ALBUMIN SERPL-MCNC: 2.2 G/DL (ref 3.5–5)
ALBUMIN/GLOB SERPL: 0.7 {RATIO} (ref 1.1–2.2)
ALP SERPL-CCNC: 48 U/L (ref 45–117)
ALT SERPL-CCNC: 46 U/L (ref 12–78)
ANION GAP SERPL CALC-SCNC: 4 MMOL/L (ref 5–15)
AST SERPL-CCNC: 45 U/L (ref 15–37)
BILIRUB SERPL-MCNC: 0.5 MG/DL (ref 0.2–1)
BUN SERPL-MCNC: 36 MG/DL (ref 6–20)
BUN/CREAT SERPL: 35 (ref 12–20)
CALCIUM SERPL-MCNC: 7.8 MG/DL (ref 8.5–10.1)
CHLORIDE SERPL-SCNC: 111 MMOL/L (ref 97–108)
CO2 SERPL-SCNC: 24 MMOL/L (ref 21–32)
CREAT SERPL-MCNC: 1.03 MG/DL (ref 0.7–1.3)
CRP SERPL-MCNC: 16.3 MG/DL (ref 0–0.6)
D DIMER PPP FEU-MCNC: 1.87 MG/L FEU (ref 0–0.65)
GLOBULIN SER CALC-MCNC: 3.2 G/DL (ref 2–4)
GLUCOSE SERPL-MCNC: 140 MG/DL (ref 65–100)
POTASSIUM SERPL-SCNC: 3.5 MMOL/L (ref 3.5–5.1)
PROT SERPL-MCNC: 5.4 G/DL (ref 6.4–8.2)
SODIUM SERPL-SCNC: 139 MMOL/L (ref 136–145)

## 2021-11-13 PROCEDURE — 74011250636 HC RX REV CODE- 250/636: Performed by: INTERNAL MEDICINE

## 2021-11-13 PROCEDURE — 74011250637 HC RX REV CODE- 250/637: Performed by: INTERNAL MEDICINE

## 2021-11-13 PROCEDURE — 94640 AIRWAY INHALATION TREATMENT: CPT

## 2021-11-13 PROCEDURE — 65660000000 HC RM CCU STEPDOWN

## 2021-11-13 PROCEDURE — 87324 CLOSTRIDIUM AG IA: CPT

## 2021-11-13 RX ADMIN — BUDESONIDE AND FORMOTEROL FUMARATE DIHYDRATE 2 PUFF: 80; 4.5 AEROSOL RESPIRATORY (INHALATION) at 20:23

## 2021-11-13 RX ADMIN — PANTOPRAZOLE SODIUM 40 MG: 40 TABLET, DELAYED RELEASE ORAL at 07:33

## 2021-11-13 RX ADMIN — OXYCODONE HYDROCHLORIDE AND ACETAMINOPHEN 250 MG: 500 TABLET ORAL at 17:39

## 2021-11-13 RX ADMIN — ENOXAPARIN SODIUM 30 MG: 100 INJECTION SUBCUTANEOUS at 12:34

## 2021-11-13 RX ADMIN — OXYCODONE HYDROCHLORIDE AND ACETAMINOPHEN 250 MG: 500 TABLET ORAL at 08:44

## 2021-11-13 RX ADMIN — ENOXAPARIN SODIUM 30 MG: 100 INJECTION SUBCUTANEOUS at 00:57

## 2021-11-13 RX ADMIN — Medication 1 CAPSULE: at 08:45

## 2021-11-13 RX ADMIN — DEXAMETHASONE 10 MG: 4 TABLET ORAL at 08:45

## 2021-11-13 RX ADMIN — BUDESONIDE AND FORMOTEROL FUMARATE DIHYDRATE 2 PUFF: 80; 4.5 AEROSOL RESPIRATORY (INHALATION) at 08:38

## 2021-11-13 RX ADMIN — ENOXAPARIN SODIUM 30 MG: 100 INJECTION SUBCUTANEOUS at 23:25

## 2021-11-13 RX ADMIN — BARICITINIB 4 MG: 2 TABLET, FILM COATED ORAL at 08:45

## 2021-11-13 NOTE — PROGRESS NOTES
Daily Progress Note: 11/13/2021  Nathalia Menon    Assessment/Plan:   1. Acute respiratory failure with hypoxia. We will admit the patient for further evaluation and treatment. This is most likely due to COVID-19 virus infection and superimposed bacterial pneumonia. These conditions will be discussed below. We will continue with supplemental oxygen. We will check BNP level to determine if there is a cardiac component to the patient's shortness of breath. We will also check cardiac markers to rule out acute myocardial infarction as another possible cause of shortness of breath. We will obtain CTA of the chest to evaluate the patient for pulmonary embolism as a possible cause of shortness of breath as well. We will continue with supplemental oxygen. - CTA negative for PE  - D-dimer elevated    2. COVID-19 virus infection. This is the most likely cause of the patient's acute respiratory failure with hypoxia. We will start the patient on Decadron. The patient will require the evaluation for remdesivir therapy if indicated. We will continue supportive therapy. - on decadron and barcitinib  - Pulm following  - CRP highly elevated    3. Bacterial pneumonia. This is superimposed on the COVID-19 virus infection. We will start the patient on Rocephin and doxycycline, and await result of CT scan of the chest.  - procalcitonin elevated, already treated with azithromycin as an outpatient, not currently on antibiotics (stopped by Dr. Cody Rg)     4. Hyperglycemia. The patient has no history of diabetes. We will check hemoglobin A1c level  - A1c 5.6    5. Other issues. Code status, the patient is a full code. We will place the patient on Lovenox for DVT prophylaxis as per COVID-19 virus infection patient protocol.      Problem List:  Problem List as of 11/13/2021 Date Reviewed: 11/12/2021          Codes Class Noted - Resolved    Pneumonia due to COVID-19 virus ICD-10-CM: U07.1, J12.82  ICD-9-CM: 480.8, 079.89  11/12/2021 - Present        Sepsis ([facility] [address] ICD-10-CM: A41.9  ICD-9-CM: 038.9, 995.91  11/12/2021 - Present        Fever ICD-10-CM: R50.9  ICD-9-CM: 780.60  11/12/2021 - Present        Sinus tachycardia ICD-10-CM: R00.0  ICD-9-CM: 427.89  11/12/2021 - Present        * (Principal) Acute respiratory failure with hypoxia (HCC) ICD-10-CM: J96.01  ICD-9-CM: 518.81  11/11/2021 - Present              Subjective: This is a 44-year-old man with no significant past medical history who was in his usual state of health until a few days ago when the patient developed body aches and pain as well as shortness of breath. The shortness of breath is associated with cough which is nonproductive. The patient was seen in the emergency room and tested positive for COVID. The patient came back to the emergency room because of worsening shortness of breath. The shortness of breath is worse with ambulation. The patient was found to have oxygen saturation of 88% at rest.  He was placed on supplemental oxygen in the emergency room and the oxygen level improved to 92%. The repeated chest x-ray of the patient shows evidence of multifocal pneumonia. The patient was referred to the hospitalist service for evaluation for admission. No prior history of respiratory disease. The patient stated that he is fully vaccinated against COVID-19 virus infection. No record of prior admission to this hospital.  The patient has no history of heart disease as well. (Dr. Brian Garcia)    11/13: Sats were 80-90% while is was in the room, staying mainly at 88% but desats with breathing. Reports SOB with minimal exertion, not much cough right now. Feels very fatigued. Review of Systems:   A comprehensive review of systems was negative except for that written in the HPI.     Objective:   Physical Exam:     Visit Vitals  /79   Pulse (!) 102   Temp 99.6 °F (37.6 °C)   Resp (!) 31   Ht 5' 9\" (1.753 m)   Wt 86.6 kg (191 lb)   SpO2 95%   BMI 28.21 kg/m²    O2 Flow Rate (L/min): 12 l/min (sats dropped increased back to 12l) O2 Device: Humidifier, Hi flow nasal cannula    Temp (24hrs), Av.9 °F (37.2 °C), Min:97 °F (36.1 °C), Max:99.6 °F (37.6 °C)    No intake/output data recorded.  1901 -  0700  In: 2760 [P.O.:2760]  Out: -     General:  Alert, cooperative, no distress, appears stated age. Head:  Normocephalic, without obvious abnormality, atraumatic. Eyes:  Conjunctivae/corneas clear. PERRL, EOMs intact. Nose: Nares normal. Septum midline. Mucosa normal. No drainage or sinus tenderness. Throat: Lips, mucosa, and tongue moist..   Neck: Supple, symmetrical, trachea midline, no adenopathy, thyroid: no enlargement/tenderness/nodules, no carotid bruit and no JVD. Back:   Symmetric, no curvature. ROM normal. No CVA tenderness. Lungs:   Scattered rales bilaterally   Chest wall:  No tenderness or deformity. Heart:  Regular rate and rhythm, S1, S2 normal, no murmur, click, rub or gallop. Abdomen:   Soft, non-tender. Bowel sounds normal. No masses,  No organomegaly. Extremities: no cyanosis or edema. No calf tenderness or cords. Pulses: 2+ and symmetric all extremities. Skin: Skin color, texture, turgor normal. No rashes or lesions   Neurologic: CNII-XII intact. Alert and oriented X 3. Fine motor of hands and fingers normal.   equal.  No cogwheeling or rigidity. Gait not tested at this time. Sensation grossly normal to touch.   Gross motor of extremities normal.       Data Review:       Recent Days:  Recent Labs     21  0006   WBC 8.1   HGB 15.2   HCT 44.9   *     Recent Labs     21  2356 21  0434 21  0006     --  135*   K 3.5  --  3.6   *  --  104   CO2 24  --  26   *  --  129*   BUN 36*  --  30*   CREA 1.03  --  1.25   CA 7.8*  --  8.5   MG  --  2.4  --    PHOS  --  4.5  --    ALB 2.2*  --  2.5*   TBILI 0.5  --  0.6   ALT 46  --  59     No results for input(s): PH, PCO2, PO2, HCO3, FIO2 in the last 72 hours. 24 Hour Results:  Recent Results (from the past 24 hour(s))   METABOLIC PANEL, COMPREHENSIVE    Collection Time: 11/12/21 11:56 PM   Result Value Ref Range    Sodium 139 136 - 145 mmol/L    Potassium 3.5 3.5 - 5.1 mmol/L    Chloride 111 (H) 97 - 108 mmol/L    CO2 24 21 - 32 mmol/L    Anion gap 4 (L) 5 - 15 mmol/L    Glucose 140 (H) 65 - 100 mg/dL    BUN 36 (H) 6 - 20 MG/DL    Creatinine 1.03 0.70 - 1.30 MG/DL    BUN/Creatinine ratio 35 (H) 12 - 20      GFR est AA >60 >60 ml/min/1.73m2    GFR est non-AA >60 >60 ml/min/1.73m2    Calcium 7.8 (L) 8.5 - 10.1 MG/DL    Bilirubin, total 0.5 0.2 - 1.0 MG/DL    ALT (SGPT) 46 12 - 78 U/L    AST (SGOT) 45 (H) 15 - 37 U/L    Alk.  phosphatase 48 45 - 117 U/L    Protein, total 5.4 (L) 6.4 - 8.2 g/dL    Albumin 2.2 (L) 3.5 - 5.0 g/dL    Globulin 3.2 2.0 - 4.0 g/dL    A-G Ratio 0.7 (L) 1.1 - 2.2     C REACTIVE PROTEIN, QT    Collection Time: 11/12/21 11:56 PM   Result Value Ref Range    C-Reactive protein 16.30 (H) 0.00 - 0.60 mg/dL   D DIMER    Collection Time: 11/12/21 11:56 PM   Result Value Ref Range    D-dimer 1.87 (H) 0.00 - 0.65 mg/L FEU       Medications reviewed  Current Facility-Administered Medications   Medication Dose Route Frequency    sodium chloride (NS) flush 5-40 mL  5-40 mL IntraVENous Q8H    sodium chloride (NS) flush 5-40 mL  5-40 mL IntraVENous PRN    acetaminophen (TYLENOL) tablet 650 mg  650 mg Oral Q6H PRN    polyethylene glycol (MIRALAX) packet 17 g  17 g Oral DAILY PRN    L.acidophilus-paracasei-S.thermophil-bifidobacter (RISAQUAD) 8 billion cell capsule  1 Capsule Oral DAILY    enoxaparin (LOVENOX) injection 30 mg  30 mg SubCUTAneous Q12H    guaiFENesin-dextromethorphan (ROBITUSSIN DM) 100-10 mg/5 mL syrup 5 mL  5 mL Oral Q4H PRN    ondansetron (ZOFRAN ODT) tablet 4 mg  4 mg Oral Q8H PRN    pantoprazole (PROTONIX) tablet 40 mg  40 mg Oral ACB    ascorbic acid (vitamin C) (VITAMIN C) tablet 250 mg  250 mg Oral BID    zinc sulfate (ZINCATE) 50 mg zinc (220 mg) capsule 1 Capsule  1 Capsule Oral DAILY    baricitinib (OLUMIANT) tablet 4 mg  4 mg Oral DAILY    ipratropium-albuterol (COMBIVENT RESPIMAT) 20 mcg-100 mcg inhalation spray  1 Puff Inhalation Q6H PRN    budesonide-formoterol (SYMBICORT) 80-4.5 mcg inhaler  2 Puff Inhalation BID RT    dexAMETHasone (DECADRON) tablet 10 mg  10 mg Oral DAILY     Current Outpatient Medications   Medication Sig    albuterol (ProAir HFA) 90 mcg/actuation inhaler Take 1 Puff by inhalation every four (4) hours as needed for Wheezing. Care Plan discussed with: Patient/Family and Nurse    Total time spent with patient: 30 minutes.     Екатерина Cardenas MD

## 2021-11-13 NOTE — PROGRESS NOTES
TRANSFER - OUT REPORT:    Verbal report given to Roosevelt Alamo RN (name) on Aleah Austin  being transferred to [facility](unit) for routine progression of care       Report consisted of patients Situation, Background, Assessment and   Recommendations(SBAR). Information from the following report(s) SBAR, ED Summary, Intake/Output, MAR, Recent Results and Cardiac Rhythm ST was reviewed with the receiving nurse. Lines:   Peripheral IV 11/12/21 Left Antecubital (Active)   Site Assessment Clean, dry, & intact 11/13/21 0006   Phlebitis Assessment 0 11/13/21 0006   Infiltration Assessment 0 11/13/21 0006   Dressing Status Clean, dry, & intact 11/13/21 0006   Dressing Type Transparent 11/13/21 0006   Hub Color/Line Status Pink; Flushed 11/13/21 0006   Action Taken Blood drawn 11/13/21 0006        Opportunity for questions and clarification was provided.       Patient transported with:   Monitor  O2 @ 12 liters

## 2021-11-13 NOTE — PROGRESS NOTES
Bedside and Verbal shift change report given to [address] (oncoming nurse) by Cyndee Nelson RN (offgoing nurse).  Report included the following information SBAR, Kardex, ED Summary, Intake/Output, MAR, Recent Results and Cardiac Rhythm ST.

## 2021-11-13 NOTE — PROGRESS NOTES
Problem: Risk for Spread of Infection  Goal: Prevent transmission of infectious organism to others  Description: Prevent the transmission of infectious organisms to other patients, staff members, and visitors. Outcome: Progressing Towards Goal     Problem: Patient Education:  Go to Education Activity  Goal: Patient/Family Education  Outcome: Progressing Towards Goal     Problem: Falls - Risk of  Goal: *Absence of Falls  Description: Document Starpoojaen Freeze Fall Risk and appropriate interventions in the flowsheet.   Outcome: Progressing Towards Goal  Note: Fall Risk Interventions:            Medication Interventions: Evaluate medications/consider consulting pharmacy, Patient to call before getting OOB, Teach patient to arise slowly    Elimination Interventions: Call light in reach, Patient to call for help with toileting needs, Stay With Me (per policy)              Problem: Patient Education: Go to Patient Education Activity  Goal: Patient/Family Education  Outcome: Progressing Towards Goal

## 2021-11-14 LAB
ALBUMIN SERPL-MCNC: 2.1 G/DL (ref 3.5–5)
ALBUMIN/GLOB SERPL: 0.7 {RATIO} (ref 1.1–2.2)
ALP SERPL-CCNC: 46 U/L (ref 45–117)
ALT SERPL-CCNC: 40 U/L (ref 12–78)
ANION GAP SERPL CALC-SCNC: 7 MMOL/L (ref 5–15)
AST SERPL-CCNC: 33 U/L (ref 15–37)
BILIRUB SERPL-MCNC: 0.5 MG/DL (ref 0.2–1)
BUN SERPL-MCNC: 44 MG/DL (ref 6–20)
BUN/CREAT SERPL: 42 (ref 12–20)
C DIFF GDH STL QL: NEGATIVE
C DIFF TOX A+B STL QL IA: NEGATIVE
CALCIUM SERPL-MCNC: 8 MG/DL (ref 8.5–10.1)
CHLORIDE SERPL-SCNC: 109 MMOL/L (ref 97–108)
CO2 SERPL-SCNC: 25 MMOL/L (ref 21–32)
CREAT SERPL-MCNC: 1.06 MG/DL (ref 0.7–1.3)
CRP SERPL-MCNC: 7.53 MG/DL (ref 0–0.6)
D DIMER PPP FEU-MCNC: 1.03 MG/L FEU (ref 0–0.65)
ERYTHROCYTE [DISTWIDTH] IN BLOOD BY AUTOMATED COUNT: 15.9 % (ref 11.5–14.5)
GLOBULIN SER CALC-MCNC: 3.1 G/DL (ref 2–4)
GLUCOSE SERPL-MCNC: 138 MG/DL (ref 65–100)
HCT VFR BLD AUTO: 40.6 % (ref 36.6–50.3)
HGB BLD-MCNC: 13.5 G/DL (ref 12.1–17)
INTERPRETATION: NORMAL
MCH RBC QN AUTO: 27.2 PG (ref 26–34)
MCHC RBC AUTO-ENTMCNC: 33.3 G/DL (ref 30–36.5)
MCV RBC AUTO: 81.7 FL (ref 80–99)
NRBC # BLD: 0 K/UL (ref 0–0.01)
NRBC BLD-RTO: 0 PER 100 WBC
PLATELET # BLD AUTO: 170 K/UL (ref 150–400)
PMV BLD AUTO: 10.7 FL (ref 8.9–12.9)
POTASSIUM SERPL-SCNC: 4 MMOL/L (ref 3.5–5.1)
PROT SERPL-MCNC: 5.2 G/DL (ref 6.4–8.2)
RBC # BLD AUTO: 4.97 M/UL (ref 4.1–5.7)
SODIUM SERPL-SCNC: 141 MMOL/L (ref 136–145)
WBC # BLD AUTO: 10.6 K/UL (ref 4.1–11.1)

## 2021-11-14 PROCEDURE — 80053 COMPREHEN METABOLIC PANEL: CPT

## 2021-11-14 PROCEDURE — 74011250636 HC RX REV CODE- 250/636: Performed by: INTERNAL MEDICINE

## 2021-11-14 PROCEDURE — 77010033711 HC HIGH FLOW OXYGEN

## 2021-11-14 PROCEDURE — 65660000000 HC RM CCU STEPDOWN

## 2021-11-14 PROCEDURE — 74011250637 HC RX REV CODE- 250/637: Performed by: INTERNAL MEDICINE

## 2021-11-14 PROCEDURE — 85027 COMPLETE CBC AUTOMATED: CPT

## 2021-11-14 PROCEDURE — 86140 C-REACTIVE PROTEIN: CPT

## 2021-11-14 PROCEDURE — 85379 FIBRIN DEGRADATION QUANT: CPT

## 2021-11-14 PROCEDURE — 94640 AIRWAY INHALATION TREATMENT: CPT

## 2021-11-14 PROCEDURE — 36415 COLL VENOUS BLD VENIPUNCTURE: CPT

## 2021-11-14 RX ADMIN — ENOXAPARIN SODIUM 30 MG: 100 INJECTION SUBCUTANEOUS at 15:18

## 2021-11-14 RX ADMIN — DEXAMETHASONE 10 MG: 4 TABLET ORAL at 08:14

## 2021-11-14 RX ADMIN — BUDESONIDE AND FORMOTEROL FUMARATE DIHYDRATE 2 PUFF: 80; 4.5 AEROSOL RESPIRATORY (INHALATION) at 20:33

## 2021-11-14 RX ADMIN — Medication 10 ML: at 19:46

## 2021-11-14 RX ADMIN — Medication 1 CAPSULE: at 08:14

## 2021-11-14 RX ADMIN — OXYCODONE HYDROCHLORIDE AND ACETAMINOPHEN 250 MG: 500 TABLET ORAL at 08:14

## 2021-11-14 RX ADMIN — BUDESONIDE AND FORMOTEROL FUMARATE DIHYDRATE 2 PUFF: 80; 4.5 AEROSOL RESPIRATORY (INHALATION) at 08:24

## 2021-11-14 RX ADMIN — BARICITINIB 4 MG: 2 TABLET, FILM COATED ORAL at 08:14

## 2021-11-14 RX ADMIN — ENOXAPARIN SODIUM 30 MG: 100 INJECTION SUBCUTANEOUS at 23:09

## 2021-11-14 RX ADMIN — Medication 1 CAPSULE: at 08:15

## 2021-11-14 RX ADMIN — Medication 10 ML: at 06:03

## 2021-11-14 RX ADMIN — PANTOPRAZOLE SODIUM 40 MG: 40 TABLET, DELAYED RELEASE ORAL at 08:14

## 2021-11-14 RX ADMIN — Medication 10 ML: at 15:18

## 2021-11-14 RX ADMIN — OXYCODONE HYDROCHLORIDE AND ACETAMINOPHEN 250 MG: 500 TABLET ORAL at 17:16

## 2021-11-14 NOTE — PROGRESS NOTES
Daily Progress Note: 11/14/2021  Nathalia Rao    Assessment/Plan:   1. Acute respiratory failure with hypoxia. We will admit the patient for further evaluation and treatment. This is most likely due to COVID-19 virus infection and superimposed bacterial pneumonia. These conditions will be discussed below. We will continue with supplemental oxygen. We will check BNP level to determine if there is a cardiac component to the patient's shortness of breath. We will also check cardiac markers to rule out acute myocardial infarction as another possible cause of shortness of breath. We will obtain CTA of the chest to evaluate the patient for pulmonary embolism as a possible cause of shortness of breath as well. We will continue with supplemental oxygen. - CTA negative for PE  - D-dimer elevated but falling    2. COVID-19 virus infection. This is the most likely cause of the patient's acute respiratory failure with hypoxia. We will start the patient on Decadron. The patient will require the evaluation for remdesivir therapy if indicated. We will continue supportive therapy. - on decadron and baricitinib  - Pulm following  - CRP highly elevated but falling    3. Bacterial pneumonia. This is superimposed on the COVID-19 virus infection. We will start the patient on Rocephin and doxycycline, and await result of CT scan of the chest.  - procalcitonin elevated, already treated with azithromycin as an outpatient, not currently on antibiotics (stopped by Dr. Zo Mendoza)     4. Hyperglycemia. The patient has no history of diabetes. We will check hemoglobin A1c level  - A1c 5.6    5. Other issues. Code status, the patient is a full code. We will place the patient on Lovenox for DVT prophylaxis as per COVID-19 virus infection patient protocol.      Problem List:  Problem List as of 11/14/2021 Date Reviewed: 11/12/2021          Codes Class Noted - Resolved    Pneumonia due to COVID-19 virus ICD-10-CM: U07.1, J12.82  ICD-9-CM: 480.8, 079.89  11/12/2021 - Present        Sepsis ([address] ICD-10-CM: A41.9  ICD-9-CM: 038.9, 995.91  11/12/2021 - Present        Fever ICD-10-CM: R50.9  ICD-9-CM: 780.60  11/12/2021 - Present        Sinus tachycardia ICD-10-CM: R00.0  ICD-9-CM: 427.89  11/12/2021 - Present        * (Principal) Acute respiratory failure with hypoxia (HCC) ICD-10-CM: J96.01  ICD-9-CM: 518.81  11/11/2021 - Present              Subjective: This is a 28-year-old man with no significant past medical history who was in his usual state of health until a few days ago when the patient developed body aches and pain as well as shortness of breath. The shortness of breath is associated with cough which is nonproductive. The patient was seen in the emergency room and tested positive for COVID. The patient came back to the emergency room because of worsening shortness of breath. The shortness of breath is worse with ambulation. The patient was found to have oxygen saturation of 88% at rest.  He was placed on supplemental oxygen in the emergency room and the oxygen level improved to 92%. The repeated chest x-ray of the patient shows evidence of multifocal pneumonia. The patient was referred to the hospitalist service for evaluation for admission. No prior history of respiratory disease. The patient stated that he is fully vaccinated against COVID-19 virus infection. No record of prior admission to this hospital.  The patient has no history of heart disease as well. (Dr. Oni Dawson)    11/13: Sats were 80-90% while is was in the room, staying mainly at 88% but desats with breathing. Reports SOB with minimal exertion, not much cough right now. Feels very fatigued. 11/14: Sats in low 90s on 15 LPM.  Reports continued SOB with minimal exertion, continued fatigue. Feels about the same as yesterday, glad he finally made it to a room.       Review of Systems:   A comprehensive review of systems was negative except for that written in the HPI. Objective:   Physical Exam:     Visit Vitals  /70 (BP 1 Location: Right upper arm)   Pulse 80   Temp 97.8 °F (36.6 °C)   Resp 18   Ht 5' 9\" (1.753 m)   Wt 86.6 kg (191 lb)   SpO2 (!) 88%   BMI 28.21 kg/m²    O2 Flow Rate (L/min): 15 l/min O2 Device: Hi flow nasal cannula, Humidifier    Temp (24hrs), Av.8 °F (36.6 °C), Min:97.2 °F (36.2 °C), Max:98.6 °F (37 °C)    No intake/output data recorded.  1901 -  0700  In: 3180 [P.O.:3180]  Out: 600 [Urine:600]    General:  Alert, cooperative, no distress, appears stated age. Head:  Normocephalic, without obvious abnormality, atraumatic. Eyes:  Conjunctivae/corneas clear. PERRL, EOMs intact. Nose: Nares normal. Septum midline. Mucosa normal. No drainage or sinus tenderness. Throat: Lips, mucosa, and tongue moist..   Neck: Supple, symmetrical, trachea midline, no adenopathy, thyroid: no enlargement/tenderness/nodules, no carotid bruit and no JVD. Back:   Symmetric, no curvature. ROM normal. No CVA tenderness. Lungs:   Scattered rales bilaterally   Chest wall:  No tenderness or deformity. Heart:  Regular rate and rhythm, S1, S2 normal, no murmur, click, rub or gallop. Abdomen:   Soft, non-tender. Bowel sounds normal. No masses,  No organomegaly. Extremities: no cyanosis or edema. No calf tenderness or cords. Pulses: 2+ and symmetric all extremities. Skin: Skin color, texture, turgor normal. No rashes or lesions   Neurologic: CNII-XII intact. Alert and oriented X 3. Fine motor of hands and fingers normal.   equal.  No cogwheeling or rigidity. Gait not tested at this time. Sensation grossly normal to touch.   Gross motor of extremities normal.       Data Review:       Recent Days:  Recent Labs     21  0311 21  0006   WBC 10.6 8.1   HGB 13.5 15.2   HCT 40.6 44.9    108*     Recent Labs     21  0311 21  2356 21  0434 21  0006    139 --  135*   K 4.0 3.5  --  3.6   * 111*  --  104   CO2 25 24  --  26   * 140*  --  129*   BUN 44* 36*  --  30*   CREA 1.06 1.03  --  1.25   CA 8.0* 7.8*  --  8.5   MG  --   --  2.4  --    PHOS  --   --  4.5  --    ALB 2.1* 2.2*  --  2.5*   TBILI 0.5 0.5  --  0.6   ALT 40 46  --  59     No results for input(s): PH, PCO2, PO2, HCO3, FIO2 in the last 72 hours. 24 Hour Results:  Recent Results (from the past 24 hour(s))   CBC W/O DIFF    Collection Time: 11/14/21  3:11 AM   Result Value Ref Range    WBC 10.6 4.1 - 11.1 K/uL    RBC 4.97 4.10 - 5.70 M/uL    HGB 13.5 12.1 - 17.0 g/dL    HCT 40.6 36.6 - 50.3 %    MCV 81.7 80.0 - 99.0 FL    MCH 27.2 26.0 - 34.0 PG    MCHC 33.3 30.0 - 36.5 g/dL    RDW 15.9 (H) 11.5 - 14.5 %    PLATELET 229 546 - 226 K/uL    MPV 10.7 8.9 - 12.9 FL    NRBC 0.0 0  WBC    ABSOLUTE NRBC 0.00 0.00 - 6.45 K/uL   METABOLIC PANEL, COMPREHENSIVE    Collection Time: 11/14/21  3:11 AM   Result Value Ref Range    Sodium 141 136 - 145 mmol/L    Potassium 4.0 3.5 - 5.1 mmol/L    Chloride 109 (H) 97 - 108 mmol/L    CO2 25 21 - 32 mmol/L    Anion gap 7 5 - 15 mmol/L    Glucose 138 (H) 65 - 100 mg/dL    BUN 44 (H) 6 - 20 MG/DL    Creatinine 1.06 0.70 - 1.30 MG/DL    BUN/Creatinine ratio 42 (H) 12 - 20      GFR est AA >60 >60 ml/min/1.73m2    GFR est non-AA >60 >60 ml/min/1.73m2    Calcium 8.0 (L) 8.5 - 10.1 MG/DL    Bilirubin, total 0.5 0.2 - 1.0 MG/DL    ALT (SGPT) 40 12 - 78 U/L    AST (SGOT) 33 15 - 37 U/L    Alk.  phosphatase 46 45 - 117 U/L    Protein, total 5.2 (L) 6.4 - 8.2 g/dL    Albumin 2.1 (L) 3.5 - 5.0 g/dL    Globulin 3.1 2.0 - 4.0 g/dL    A-G Ratio 0.7 (L) 1.1 - 2.2     C REACTIVE PROTEIN, QT    Collection Time: 11/14/21  3:11 AM   Result Value Ref Range    C-Reactive protein 7.53 (H) 0.00 - 0.60 mg/dL   D DIMER    Collection Time: 11/14/21  3:11 AM   Result Value Ref Range    D-dimer 1.03 (H) 0.00 - 0.65 mg/L FEU       Medications reviewed  Current Facility-Administered Medications   Medication Dose Route Frequency    sodium chloride (OCEAN) 0.65 % nasal squeeze bottle 2 Spray  2 Spray Both Nostrils Q2H PRN    sodium chloride (NS) flush 5-40 mL  5-40 mL IntraVENous Q8H    sodium chloride (NS) flush 5-40 mL  5-40 mL IntraVENous PRN    acetaminophen (TYLENOL) tablet 650 mg  650 mg Oral Q6H PRN    polyethylene glycol (MIRALAX) packet 17 g  17 g Oral DAILY PRN    L.acidophilus-paracasei-S.thermophil-bifidobacter (RISAQUAD) 8 billion cell capsule  1 Capsule Oral DAILY    enoxaparin (LOVENOX) injection 30 mg  30 mg SubCUTAneous Q12H    guaiFENesin-dextromethorphan (ROBITUSSIN DM) 100-10 mg/5 mL syrup 5 mL  5 mL Oral Q4H PRN    ondansetron (ZOFRAN ODT) tablet 4 mg  4 mg Oral Q8H PRN    pantoprazole (PROTONIX) tablet 40 mg  40 mg Oral ACB    ascorbic acid (vitamin C) (VITAMIN C) tablet 250 mg  250 mg Oral BID    zinc sulfate (ZINCATE) 50 mg zinc (220 mg) capsule 1 Capsule  1 Capsule Oral DAILY    baricitinib (OLUMIANT) tablet 4 mg  4 mg Oral DAILY    ipratropium-albuterol (COMBIVENT RESPIMAT) 20 mcg-100 mcg inhalation spray  1 Puff Inhalation Q6H PRN    budesonide-formoterol (SYMBICORT) 80-4.5 mcg inhaler  2 Puff Inhalation BID RT    dexAMETHasone (DECADRON) tablet 10 mg  10 mg Oral DAILY       Care Plan discussed with: Patient/Family and Nurse    Total time spent with patient: 30 minutes.     Leonardo Zurita MD

## 2021-11-14 NOTE — PROGRESS NOTES
Bedside shift change report given to [facility] (oncoming nurse) by Louis Nguyen (offgoing nurse). Report included the following information SBAR, Kardex, ED Summary, Intake/Output, MAR, Recent Results, Med Rec Status and Cardiac Rhythm NSR. Bedside shift change report given to [facility] (oncoming nurse) by [facility] (offgoing nurse). Report included the following information SBAR, Kardex, ED Summary, Procedure Summary, Intake/Output, MAR, Recent Results and Cardiac Rhythm NSR.

## 2021-11-14 NOTE — PROGRESS NOTES
Problem: Risk for Spread of Infection  Goal: Prevent transmission of infectious organism to others  Description: Prevent the transmission of infectious organisms to other patients, staff members, and visitors. Outcome: Progressing Towards Goal     Problem: Patient Education:  Go to Education Activity  Goal: Patient/Family Education  Outcome: Progressing Towards Goal     Problem: Falls - Risk of  Goal: *Absence of Falls  Description: Document Linda Hernandez Fall Risk and appropriate interventions in the flowsheet. Outcome: Progressing Towards Goal  Note: Fall Risk Interventions:  Mobility Interventions: Bed/chair exit alarm, Communicate number of staff needed for ambulation/transfer, Patient to call before getting OOB, PT Consult for mobility concerns         Medication Interventions: Bed/chair exit alarm    Elimination Interventions: Bed/chair exit alarm, Call light in reach, Patient to call for help with toileting needs, Toileting schedule/hourly rounds              Problem: Patient Education: Go to Patient Education Activity  Goal: Patient/Family Education  Outcome: Progressing Towards Goal     Problem: Airway Clearance - Ineffective  Goal: Achieve or maintain patent airway  Outcome: Progressing Towards Goal     Problem: Gas Exchange - Impaired  Goal: Absence of hypoxia  Outcome: Progressing Towards Goal  Goal: Promote optimal lung function  Outcome: Progressing Towards Goal     Problem: Breathing Pattern - Ineffective  Goal: Ability to achieve and maintain a regular respiratory rate  Outcome: Progressing Towards Goal     Problem:  Body Temperature -  Risk of, Imbalanced  Goal: Ability to maintain a body temperature within defined limits  Outcome: Progressing Towards Goal

## 2021-11-14 NOTE — PROGRESS NOTES
0700 Bedside shift change report given to [facility] JESUS Chandler (oncoming nurse) by Kassie Carreon (offgoing nurse). Report included the following information SBAR, Kardex, ED Summary, Intake/Output, MAR and Recent Results. This patient was assisted with Intentional Toileting every 2 hours during this shift as appropriate. Documentation of ambulation and output reflected on Flowsheet as appropriate. Purposeful hourly rounding was completed using AIDET and 5Ps. Outcomes of PHR documented as they occurred. Bed alarm in use as appropriate. Dual Suction and ambubag in place. Bedside shift change report given to [facility] RN (oncoming nurse) by Jennifer Navarrete RN (offgoing nurse). Report included the following information SBAR, Kardex, ED Summary, Intake/Output, MAR and Recent Results.

## 2021-11-15 LAB
ALBUMIN SERPL-MCNC: 2.4 G/DL (ref 3.5–5)
ALBUMIN/GLOB SERPL: 0.7 {RATIO} (ref 1.1–2.2)
ALP SERPL-CCNC: 49 U/L (ref 45–117)
ALT SERPL-CCNC: 39 U/L (ref 12–78)
ANION GAP SERPL CALC-SCNC: 7 MMOL/L (ref 5–15)
AST SERPL-CCNC: 31 U/L (ref 15–37)
BILIRUB SERPL-MCNC: 0.6 MG/DL (ref 0.2–1)
BUN SERPL-MCNC: 41 MG/DL (ref 6–20)
BUN/CREAT SERPL: 38 (ref 12–20)
CALCIUM SERPL-MCNC: 8.4 MG/DL (ref 8.5–10.1)
CHLORIDE SERPL-SCNC: 106 MMOL/L (ref 97–108)
CO2 SERPL-SCNC: 26 MMOL/L (ref 21–32)
CREAT SERPL-MCNC: 1.08 MG/DL (ref 0.7–1.3)
CRP SERPL-MCNC: 3.25 MG/DL (ref 0–0.6)
D DIMER PPP FEU-MCNC: 1.12 MG/L FEU (ref 0–0.65)
ERYTHROCYTE [DISTWIDTH] IN BLOOD BY AUTOMATED COUNT: 15.4 % (ref 11.5–14.5)
GLOBULIN SER CALC-MCNC: 3.5 G/DL (ref 2–4)
GLUCOSE SERPL-MCNC: 155 MG/DL (ref 65–100)
HCT VFR BLD AUTO: 39.8 % (ref 36.6–50.3)
HGB BLD-MCNC: 13.2 G/DL (ref 12.1–17)
MCH RBC QN AUTO: 27.3 PG (ref 26–34)
MCHC RBC AUTO-ENTMCNC: 33.2 G/DL (ref 30–36.5)
MCV RBC AUTO: 82.2 FL (ref 80–99)
NRBC # BLD: 0 K/UL (ref 0–0.01)
NRBC BLD-RTO: 0 PER 100 WBC
PLATELET # BLD AUTO: 215 K/UL (ref 150–400)
PMV BLD AUTO: 11 FL (ref 8.9–12.9)
POTASSIUM SERPL-SCNC: 4 MMOL/L (ref 3.5–5.1)
PROT SERPL-MCNC: 5.9 G/DL (ref 6.4–8.2)
RBC # BLD AUTO: 4.84 M/UL (ref 4.1–5.7)
SODIUM SERPL-SCNC: 139 MMOL/L (ref 136–145)
WBC # BLD AUTO: 9.6 K/UL (ref 4.1–11.1)

## 2021-11-15 PROCEDURE — 36415 COLL VENOUS BLD VENIPUNCTURE: CPT

## 2021-11-15 PROCEDURE — 94640 AIRWAY INHALATION TREATMENT: CPT

## 2021-11-15 PROCEDURE — 74011250636 HC RX REV CODE- 250/636: Performed by: INTERNAL MEDICINE

## 2021-11-15 PROCEDURE — 85379 FIBRIN DEGRADATION QUANT: CPT

## 2021-11-15 PROCEDURE — 80053 COMPREHEN METABOLIC PANEL: CPT

## 2021-11-15 PROCEDURE — 85027 COMPLETE CBC AUTOMATED: CPT

## 2021-11-15 PROCEDURE — 77010033711 HC HIGH FLOW OXYGEN

## 2021-11-15 PROCEDURE — 65660000000 HC RM CCU STEPDOWN

## 2021-11-15 PROCEDURE — 86140 C-REACTIVE PROTEIN: CPT

## 2021-11-15 PROCEDURE — 74011250637 HC RX REV CODE- 250/637: Performed by: INTERNAL MEDICINE

## 2021-11-15 PROCEDURE — 94664 DEMO&/EVAL PT USE INHALER: CPT

## 2021-11-15 RX ADMIN — Medication 1 CAPSULE: at 08:47

## 2021-11-15 RX ADMIN — DEXAMETHASONE 10 MG: 4 TABLET ORAL at 08:46

## 2021-11-15 RX ADMIN — BUDESONIDE AND FORMOTEROL FUMARATE DIHYDRATE 2 PUFF: 80; 4.5 AEROSOL RESPIRATORY (INHALATION) at 07:52

## 2021-11-15 RX ADMIN — Medication 10 ML: at 04:20

## 2021-11-15 RX ADMIN — SALINE NASAL SPRAY 2 SPRAY: 1.5 SOLUTION NASAL at 04:21

## 2021-11-15 RX ADMIN — BUDESONIDE AND FORMOTEROL FUMARATE DIHYDRATE 2 PUFF: 80; 4.5 AEROSOL RESPIRATORY (INHALATION) at 22:39

## 2021-11-15 RX ADMIN — BARICITINIB 4 MG: 2 TABLET, FILM COATED ORAL at 08:47

## 2021-11-15 RX ADMIN — Medication 10 ML: at 22:00

## 2021-11-15 RX ADMIN — Medication 1 CAPSULE: at 08:46

## 2021-11-15 RX ADMIN — PANTOPRAZOLE SODIUM 40 MG: 40 TABLET, DELAYED RELEASE ORAL at 04:20

## 2021-11-15 RX ADMIN — ENOXAPARIN SODIUM 30 MG: 100 INJECTION SUBCUTANEOUS at 14:29

## 2021-11-15 RX ADMIN — OXYCODONE HYDROCHLORIDE AND ACETAMINOPHEN 250 MG: 500 TABLET ORAL at 17:22

## 2021-11-15 RX ADMIN — OXYCODONE HYDROCHLORIDE AND ACETAMINOPHEN 250 MG: 500 TABLET ORAL at 08:46

## 2021-11-15 RX ADMIN — Medication 10 ML: at 14:29

## 2021-11-15 NOTE — PROGRESS NOTES
Bedside shift change report given to [facility] (oncoming nurse) by Alize Burgos (offgoing nurse). Report included the following information SBAR, Kardex, ED Summary, Procedure Summary, MAR, Recent Results and Cardiac Rhythm NSR. Bedside shift change report given to Michael Valentin (oncoming nurse) by [facility] (offgoing nurse). Report included the following information SBAR, Kardex, ED Summary, Procedure Summary, Intake/Output, MAR, Recent Results and Cardiac Rhythm NSR.

## 2021-11-15 NOTE — PROGRESS NOTES
Transition of Care Plan:    RUR-9%                1. Home with family assistance  2. PCP follow up  3. Pt on 20 L O2  4. Pulmonary following  5. CM following for any needs prior to d/c  L. Sabas Daily RN

## 2021-11-15 NOTE — PROGRESS NOTES
Report received and care assumed. Enteric contact precautions dc'd/ Cdiff. Stool specimen is negative. See assessment/ interventions. NAD. O2 Sat 96% with HF O2 as ordered.

## 2021-11-15 NOTE — PROGRESS NOTES
Problem: Risk for Spread of Infection  Goal: Prevent transmission of infectious organism to others  Description: Prevent the transmission of infectious organisms to other patients, staff members, and visitors. Outcome: Progressing Towards Goal     Problem: Patient Education:  Go to Education Activity  Goal: Patient/Family Education  Outcome: Progressing Towards Goal     Problem: Falls - Risk of  Goal: *Absence of Falls  Description: Document Earma So Fall Risk and appropriate interventions in the flowsheet.   Outcome: Progressing Towards Goal  Note: Fall Risk Interventions:  Mobility Interventions: Bed/chair exit alarm         Medication Interventions: Patient to call before getting OOB    Elimination Interventions: Call light in reach, Patient to call for help with toileting needs              Problem: Patient Education: Go to Patient Education Activity  Goal: Patient/Family Education  Outcome: Progressing Towards Goal     Problem: Airway Clearance - Ineffective  Goal: Achieve or maintain patent airway  Outcome: Progressing Towards Goal

## 2021-11-15 NOTE — PROGRESS NOTES
[facility]     Name: Audrey Carr MRN: [id number]   : 1960 Hospital: [address]   Date: 11/15/2021        Impression Plan   1. Acute respiratory failure  2. Hypoxia  3. COVID 19 PNA               · Wean O2 to keep sats above 90%; weaned to 25L/100%. · Continue dexamethason 10 mg daily   · Cont Baricitinib  · Pt instructed to self prone 3 hrs a night  · OOB as much as possible  · Follow d-dimer  · Encourage IS use and proning  · enox 30 mg q12h             Radiology  ( personally reviewed) CTA chest: bilateral infiltrates, no PE   ABG No results for input(s): PHI, PO2I, PCO2I in the last 72 hours. Subjective     Cc: shortness of breath    65 yo with no PMHx presenting with increasing shortness of breath and weakness. COVID 19 positive. Pt states that he has been sick since 11/3. Pt had J&J vaccine 10/25/21. Non-smoker. No underlying lung problems. Currently on 10 L O2    Review of Systems:  A comprehensive review of systems was negative except for that written in the HPI. Interval History:    Afebrile  BP stable  Sats 98% on 25L HFNC--improved  D-dimer 1.12--increased slightly  CRP 3.25--decreased  C.diff negative    ROS:  Feels better today. Breathing okay. Coughing up a lot of clear sputum. No fever/chills. No past medical history on file. No past surgical history on file. Prior to Admission medications    Medication Sig Start Date End Date Taking? Authorizing Provider   albuterol (ProAir HFA) 90 mcg/actuation inhaler Take 1 Puff by inhalation every four (4) hours as needed for Wheezing.  21  Yes Les Mccrary NP     Current Facility-Administered Medications   Medication Dose Route Frequency    sodium chloride (NS) flush 5-40 mL  5-40 mL IntraVENous Q8H    L.acidophilus-paracasei-S.thermophil-bifidobacter (RISAQUAD) 8 billion cell capsule  1 Capsule Oral DAILY    enoxaparin (LOVENOX) injection 30 mg  30 mg SubCUTAneous Q12H    pantoprazole (PROTONIX) tablet 40 mg  40 mg Oral ACB    ascorbic acid (vitamin C) (VITAMIN C) tablet 250 mg  250 mg Oral BID    zinc sulfate (ZINCATE) 50 mg zinc (220 mg) capsule 1 Capsule  1 Capsule Oral DAILY    baricitinib (OLUMIANT) tablet 4 mg  4 mg Oral DAILY    budesonide-formoterol (SYMBICORT) 80-4.5 mcg inhaler  2 Puff Inhalation BID RT    dexAMETHasone (DECADRON) tablet 10 mg  10 mg Oral DAILY     No Known Allergies   Social History     Tobacco Use    Smoking status: Never Smoker    Smokeless tobacco: Never Used   Substance Use Topics    Alcohol use: Not on file      No family history on file. Laboratory: I have personally reviewed the critical care flowsheet and labs. Recent Labs     11/15/21  0418 11/14/21  0311   WBC 9.6 10.6   HGB 13.2 13.5   HCT 39.8 40.6    170     Recent Labs     11/15/21  0418 11/14/21  0311 11/12/21  2356    141 139   K 4.0 4.0 3.5    109* 111*   CO2 26 25 24   * 138* 140*   BUN 41* 44* 36*   CREA 1.08 1.06 1.03   CA 8.4* 8.0* 7.8*   ALB 2.4* 2.1* 2.2*   ALT 39 40 46       Objective:     Mode Rate Tidal Volume Pressure FiO2 PEEP            100 %       Vital Signs:     TMAX(24)      Intake/Output:   Last shift:         Last 3 shifts: No intake/output data recorded. [id number]    Intake/Output Summary (Last 24 hours) at 11/15/2021 0934  Last data filed at 11/15/2021 0439  Gross per 24 hour   Intake 480 ml   Output 500 ml   Net -20 ml     EXAM:   GENERAL: awake, alert,  On HFHEENT:  PERRL, EOMI, no alar flaring or epistaxis, oral mucosa moist without cyanosis, NECK:  no jugular vein distention, no retractions, no thyromegaly or masses, LUNGS: CTA, no wheezes , HEART:  Regular rate and rhythm with no MGR; no edema is present, ABDOMEN:  soft with no tenderness, bowel sounds present, EXTREMITIES:  warm with no cyanosis, SKIN:  no jaundice or ecchymosis and NEUROLOGIC:  alert and oriented, grossly non-focal    Darlen Casa, NP  Pulmonary [address]

## 2021-11-15 NOTE — PROGRESS NOTES
NUTRITION     Nutrition screening referral was triggered based on results obtained during nursing admission assessment for Unintentional wt loss: 2-13#     Wt Readings from Last 30 Encounters:   11/11/21 86.6 kg (191 lb)   08/01/21 86.6 kg (191 lb)          The patient's chart was reviewed and nutrition assessment is not indicated at this time. Plan to see patient for rescreen as indicated. Thank you.      Dina Vee RD

## 2021-11-15 NOTE — PROGRESS NOTES
Daily Progress Note: 11/15/2021  Nathalia Aguilar    Assessment/Plan:   1. Acute respiratory failure with hypoxia. We will admit the patient for further evaluation and treatment. This is most likely due to COVID-19 virus infection and superimposed bacterial pneumonia. These conditions will be discussed below. We will continue with supplemental oxygen. We will check BNP level to determine if there is a cardiac component to the patient's shortness of breath. We will also check cardiac markers to rule out acute myocardial infarction as another possible cause of shortness of breath. We will obtain CTA of the chest to evaluate the patient for pulmonary embolism as a possible cause of shortness of breath as well. We will continue with supplemental oxygen. - CTA negative for PE  - D-dimer elevated but falling    2. COVID-19 virus infection. This is the most likely cause of the patient's acute respiratory failure with hypoxia. We will start the patient on Decadron. The patient will require the evaluation for remdesivir therapy if indicated. We will continue supportive therapy. - on decadron and baricitinib  - Pulm following  - CRP highly elevated but falling    3. Bacterial pneumonia. This is superimposed on the COVID-19 virus infection. We will start the patient on Rocephin and doxycycline, and await result of CT scan of the chest.  - procalcitonin elevated, already treated with azithromycin as an outpatient, not currently on antibiotics (stopped by Dr. Sanjuanita Becerril)     4. Hyperglycemia. The patient has no history of diabetes. We will check hemoglobin A1c level  - A1c 5.6    5. Other issues. Code status, the patient is a full code. We will place the patient on Lovenox for DVT prophylaxis as per COVID-19 virus infection patient protocol.      Problem List:  Problem List as of 11/15/2021 Date Reviewed: 11/12/2021          Codes Class Noted - Resolved    Pneumonia due to COVID-19 virus ICD-10-CM: U07.1, J12.82  ICD-9-CM: 480.8, 079.89  11/12/2021 - Present        Sepsis ([address] ICD-10-CM: A41.9  ICD-9-CM: 038.9, 995.91  11/12/2021 - Present        Fever ICD-10-CM: R50.9  ICD-9-CM: 780.60  11/12/2021 - Present        Sinus tachycardia ICD-10-CM: R00.0  ICD-9-CM: 427.89  11/12/2021 - Present        * (Principal) Acute respiratory failure with hypoxia (HCC) ICD-10-CM: J96.01  ICD-9-CM: 518.81  11/11/2021 - Present              Subjective: This is a 60-year-old man with no significant past medical history who was in his usual state of health until a few days ago when the patient developed body aches and pain as well as shortness of breath. The shortness of breath is associated with cough which is nonproductive. The patient was seen in the emergency room and tested positive for COVID. The patient came back to the emergency room because of worsening shortness of breath. The shortness of breath is worse with ambulation. The patient was found to have oxygen saturation of 88% at rest.  He was placed on supplemental oxygen in the emergency room and the oxygen level improved to 92%. The repeated chest x-ray of the patient shows evidence of multifocal pneumonia. The patient was referred to the hospitalist service for evaluation for admission. No prior history of respiratory disease. The patient stated that he is fully vaccinated against COVID-19 virus infection. No record of prior admission to this hospital.  The patient has no history of heart disease as well. (Dr. Gwendolyn Bergeron)    11/13: Sats were 80-90% while is was in the room, staying mainly at 88% but desats with breathing. Reports SOB with minimal exertion, not much cough right now. Feels very fatigued. 11/14: Sats in low 90s on 15 LPM.  Reports continued SOB with minimal exertion, continued fatigue. Feels about the same as yesterday, glad he finally made it to a room.     11/15: Still c/o cough and feels SOB but improved from yesterday he feels. Weaned down to 25L high flow this AM.     Review of Systems:   A comprehensive review of systems was negative except for that written in the HPI. Objective:   Physical Exam:     Visit Vitals  /65   Pulse 69   Temp 98 °F (36.7 °C)   Resp 20   Ht 5' 9\" (1.753 m)   Wt 191 lb (86.6 kg)   SpO2 97%   BMI 28.21 kg/m²    O2 Flow Rate (L/min): 30 l/min O2 Device: Heated, Hi flow nasal cannula  Temp (24hrs), Av.9 °F (36.6 °C), Min:97.7 °F (36.5 °C), Max:98.2 °F (36.8 °C)    1901 - 11/15 0700  In: 480 [P.O.:480]  Out: 500 [Urine:500]    07 -  1900  In: 1020 [P.O.:1020]  Out: 600 [Urine:600]    General:  Alert, cooperative, no distress, appears stated age. Head:  Normocephalic, without obvious abnormality, atraumatic. Eyes:  Conjunctivae/corneas clear. PERRL, EOMs intact. Nose: High flow in place   Throat: Lips, mucosa, and tongue moist..   Neck: Supple, symmetrical, trachea midline, no adenopathy, thyroid: no enlargement/tenderness/nodules, no carotid bruit and no JVD. Back:   Symmetric, no curvature. ROM normal. No CVA tenderness. Lungs:   Scattered rales bilaterally in lower lobes   Chest wall:  No tenderness or deformity. Heart:  Regular rate and rhythm, S1, S2 normal, no murmur, click, rub or gallop. Abdomen:   Soft, non-tender. Bowel sounds normal. No masses,  No organomegaly. Extremities: no cyanosis or edema. No calf tenderness or cords. Pulses: 2+ and symmetric all extremities. Skin: Skin color, texture, turgor normal. No rashes    Neurologic:   Alert and oriented X 3. Fine motor of hands and fingers normal.   equal.  No cogwheeling or rigidity. Gait not tested at this time. Sensation grossly normal to touch. Gross motor of extremities normal.       Data Review:     EXAM:  CTA Chest with contrast for Pulmonary Embolus 11/12/21  COMPARISON:  2021  FINDINGS:  THYROID: Unremarkable. MEDIASTINUM/SHAHRAM: 15 mm right hilar lymph node. Moderate-sized hiatal hernia  containing portion of the gastric body/antrum (essentially paraesophageal  hernia). HEART/PERICARDIUM: Unremarkable. AORTA:  No aneurysm. PULMONARY ARTERIES:No pulmonary embolism. LUNGS/PLEURA: Patchy bilateral airspace disease/groundglass opacities. No  pneumothorax or pleural effusion. INCIDENTALLY IMAGED UPPER ABDOMEN: No significant abnormality. BONES: No destructive bone lesion. ADDITIONAL COMMENTS:  N/A  IMPRESSION  1. No acute pulmonary embolus. 2. Patchy bilateral airspace disease.     Lab Results   Component Value Date/Time    C-Reactive protein 3.25 (H) 11/15/2021 04:18 AM     Recent Days:  Recent Labs     11/15/21  0418 11/14/21  0311   WBC 9.6 10.6   HGB 13.2 13.5   HCT 39.8 40.6    170     Recent Labs     11/15/21  0418 11/14/21  0311 11/12/21  2356    141 139   K 4.0 4.0 3.5    109* 111*   CO2 26 25 24   * 138* 140*   BUN 41* 44* 36*   CREA 1.08 1.06 1.03   CA 8.4* 8.0* 7.8*   ALB 2.4* 2.1* 2.2*   TBILI 0.6 0.5 0.5   ALT 39 40 46     No results for input(s): PH, PCO2, PO2, HCO3, FIO2 in the last 72 hours.     24 Hour Results:  Recent Results (from the past 24 hour(s))   CBC W/O DIFF    Collection Time: 11/15/21  4:18 AM   Result Value Ref Range    WBC 9.6 4.1 - 11.1 K/uL    RBC 4.84 4.10 - 5.70 M/uL    HGB 13.2 12.1 - 17.0 g/dL    HCT 39.8 36.6 - 50.3 %    MCV 82.2 80.0 - 99.0 FL    MCH 27.3 26.0 - 34.0 PG    MCHC 33.2 30.0 - 36.5 g/dL    RDW 15.4 (H) 11.5 - 14.5 %    PLATELET 814 950 - 746 K/uL    MPV 11.0 8.9 - 12.9 FL    NRBC 0.0 0  WBC    ABSOLUTE NRBC 0.00 0.00 - 6.71 K/uL   METABOLIC PANEL, COMPREHENSIVE    Collection Time: 11/15/21  4:18 AM   Result Value Ref Range    Sodium 139 136 - 145 mmol/L    Potassium 4.0 3.5 - 5.1 mmol/L    Chloride 106 97 - 108 mmol/L    CO2 26 21 - 32 mmol/L    Anion gap 7 5 - 15 mmol/L    Glucose 155 (H) 65 - 100 mg/dL    BUN 41 (H) 6 - 20 MG/DL    Creatinine 1.08 0.70 - 1.30 MG/DL    BUN/Creatinine ratio 38 (H) 12 - 20      GFR est AA >60 >60 ml/min/1.73m2    GFR est non-AA >60 >60 ml/min/1.73m2    Calcium 8.4 (L) 8.5 - 10.1 MG/DL    Bilirubin, total 0.6 0.2 - 1.0 MG/DL    ALT (SGPT) 39 12 - 78 U/L    AST (SGOT) 31 15 - 37 U/L    Alk.  phosphatase 49 45 - 117 U/L    Protein, total 5.9 (L) 6.4 - 8.2 g/dL    Albumin 2.4 (L) 3.5 - 5.0 g/dL    Globulin 3.5 2.0 - 4.0 g/dL    A-G Ratio 0.7 (L) 1.1 - 2.2     C REACTIVE PROTEIN, QT    Collection Time: 11/15/21  4:18 AM   Result Value Ref Range    C-Reactive protein 3.25 (H) 0.00 - 0.60 mg/dL   D DIMER    Collection Time: 11/15/21  4:18 AM   Result Value Ref Range    D-dimer 1.12 (H) 0.00 - 0.65 mg/L FEU       Medications reviewed  Current Facility-Administered Medications   Medication Dose Route Frequency    sodium chloride (OCEAN) 0.65 % nasal squeeze bottle 2 Spray  2 Spray Both Nostrils Q2H PRN    sodium chloride (NS) flush 5-40 mL  5-40 mL IntraVENous Q8H    sodium chloride (NS) flush 5-40 mL  5-40 mL IntraVENous PRN    acetaminophen (TYLENOL) tablet 650 mg  650 mg Oral Q6H PRN    polyethylene glycol (MIRALAX) packet 17 g  17 g Oral DAILY PRN    L.acidophilus-paracasei-S.thermophil-bifidobacter (RISAQUAD) 8 billion cell capsule  1 Capsule Oral DAILY    enoxaparin (LOVENOX) injection 30 mg  30 mg SubCUTAneous Q12H    guaiFENesin-dextromethorphan (ROBITUSSIN DM) 100-10 mg/5 mL syrup 5 mL  5 mL Oral Q4H PRN    ondansetron (ZOFRAN ODT) tablet 4 mg  4 mg Oral Q8H PRN    pantoprazole (PROTONIX) tablet 40 mg  40 mg Oral ACB    ascorbic acid (vitamin C) (VITAMIN C) tablet 250 mg  250 mg Oral BID    zinc sulfate (ZINCATE) 50 mg zinc (220 mg) capsule 1 Capsule  1 Capsule Oral DAILY    baricitinib (OLUMIANT) tablet 4 mg  4 mg Oral DAILY    ipratropium-albuterol (COMBIVENT RESPIMAT) 20 mcg-100 mcg inhalation spray  1 Puff Inhalation Q6H PRN    budesonide-formoterol (SYMBICORT) 80-4.5 mcg inhaler  2 Puff Inhalation BID RT    dexAMETHasone (DECADRON) tablet 10 mg 10 mg Oral DAILY       Care Plan discussed with: Patient/Family and Nurse    Total time spent with patient: 30 minutes.     Erlin Melo MD

## 2021-11-16 LAB
ALBUMIN SERPL-MCNC: 2.3 G/DL (ref 3.5–5)
ALBUMIN/GLOB SERPL: 0.7 {RATIO} (ref 1.1–2.2)
ALP SERPL-CCNC: 46 U/L (ref 45–117)
ALT SERPL-CCNC: 40 U/L (ref 12–78)
ANION GAP SERPL CALC-SCNC: 5 MMOL/L (ref 5–15)
AST SERPL-CCNC: 30 U/L (ref 15–37)
BASOPHILS # BLD: 0 K/UL (ref 0–0.1)
BASOPHILS NFR BLD: 0 % (ref 0–1)
BILIRUB SERPL-MCNC: 0.7 MG/DL (ref 0.2–1)
BUN SERPL-MCNC: 32 MG/DL (ref 6–20)
BUN/CREAT SERPL: 39 (ref 12–20)
CALCIUM SERPL-MCNC: 7.8 MG/DL (ref 8.5–10.1)
CHLORIDE SERPL-SCNC: 106 MMOL/L (ref 97–108)
CO2 SERPL-SCNC: 26 MMOL/L (ref 21–32)
CREAT SERPL-MCNC: 0.83 MG/DL (ref 0.7–1.3)
CRP SERPL-MCNC: 1.53 MG/DL (ref 0–0.6)
DIFFERENTIAL METHOD BLD: ABNORMAL
EOSINOPHIL # BLD: 0 K/UL (ref 0–0.4)
EOSINOPHIL NFR BLD: 0 % (ref 0–7)
ERYTHROCYTE [DISTWIDTH] IN BLOOD BY AUTOMATED COUNT: 14.8 % (ref 11.5–14.5)
GLOBULIN SER CALC-MCNC: 3.1 G/DL (ref 2–4)
GLUCOSE SERPL-MCNC: 137 MG/DL (ref 65–100)
HCT VFR BLD AUTO: 41 % (ref 36.6–50.3)
HGB BLD-MCNC: 13.3 G/DL (ref 12.1–17)
IMM GRANULOCYTES # BLD AUTO: 0 K/UL
IMM GRANULOCYTES NFR BLD AUTO: 0 %
LYMPHOCYTES # BLD: 0.4 K/UL (ref 0.8–3.5)
LYMPHOCYTES NFR BLD: 4 % (ref 12–49)
MCH RBC QN AUTO: 26.9 PG (ref 26–34)
MCHC RBC AUTO-ENTMCNC: 32.4 G/DL (ref 30–36.5)
MCV RBC AUTO: 83 FL (ref 80–99)
MONOCYTES # BLD: 0.5 K/UL (ref 0–1)
MONOCYTES NFR BLD: 5 % (ref 5–13)
NEUTS BAND NFR BLD MANUAL: 4 % (ref 0–6)
NEUTS SEG # BLD: 8.5 K/UL (ref 1.8–8)
NEUTS SEG NFR BLD: 87 % (ref 32–75)
NRBC # BLD: 0 K/UL (ref 0–0.01)
NRBC BLD-RTO: 0 PER 100 WBC
PLATELET # BLD AUTO: 280 K/UL (ref 150–400)
PMV BLD AUTO: 10.1 FL (ref 8.9–12.9)
POTASSIUM SERPL-SCNC: 4.7 MMOL/L (ref 3.5–5.1)
PROT SERPL-MCNC: 5.4 G/DL (ref 6.4–8.2)
RBC # BLD AUTO: 4.94 M/UL (ref 4.1–5.7)
RBC MORPH BLD: ABNORMAL
SODIUM SERPL-SCNC: 137 MMOL/L (ref 136–145)
WBC # BLD AUTO: 9.4 K/UL (ref 4.1–11.1)

## 2021-11-16 PROCEDURE — 94640 AIRWAY INHALATION TREATMENT: CPT

## 2021-11-16 PROCEDURE — 94664 DEMO&/EVAL PT USE INHALER: CPT

## 2021-11-16 PROCEDURE — 80053 COMPREHEN METABOLIC PANEL: CPT

## 2021-11-16 PROCEDURE — 74011250636 HC RX REV CODE- 250/636: Performed by: INTERNAL MEDICINE

## 2021-11-16 PROCEDURE — 77010033711 HC HIGH FLOW OXYGEN

## 2021-11-16 PROCEDURE — 86140 C-REACTIVE PROTEIN: CPT

## 2021-11-16 PROCEDURE — 65660000000 HC RM CCU STEPDOWN

## 2021-11-16 PROCEDURE — 85025 COMPLETE CBC W/AUTO DIFF WBC: CPT

## 2021-11-16 PROCEDURE — 74011250637 HC RX REV CODE- 250/637: Performed by: INTERNAL MEDICINE

## 2021-11-16 PROCEDURE — 36415 COLL VENOUS BLD VENIPUNCTURE: CPT

## 2021-11-16 RX ADMIN — OXYCODONE HYDROCHLORIDE AND ACETAMINOPHEN 250 MG: 500 TABLET ORAL at 17:25

## 2021-11-16 RX ADMIN — BUDESONIDE AND FORMOTEROL FUMARATE DIHYDRATE 2 PUFF: 80; 4.5 AEROSOL RESPIRATORY (INHALATION) at 07:47

## 2021-11-16 RX ADMIN — BUDESONIDE AND FORMOTEROL FUMARATE DIHYDRATE 2 PUFF: 80; 4.5 AEROSOL RESPIRATORY (INHALATION) at 21:50

## 2021-11-16 RX ADMIN — OXYCODONE HYDROCHLORIDE AND ACETAMINOPHEN 250 MG: 500 TABLET ORAL at 08:15

## 2021-11-16 RX ADMIN — PANTOPRAZOLE SODIUM 40 MG: 40 TABLET, DELAYED RELEASE ORAL at 08:15

## 2021-11-16 RX ADMIN — ENOXAPARIN SODIUM 30 MG: 100 INJECTION SUBCUTANEOUS at 01:03

## 2021-11-16 RX ADMIN — Medication 10 ML: at 19:57

## 2021-11-16 RX ADMIN — Medication 1 CAPSULE: at 08:15

## 2021-11-16 RX ADMIN — ENOXAPARIN SODIUM 30 MG: 100 INJECTION SUBCUTANEOUS at 12:59

## 2021-11-16 RX ADMIN — Medication 10 ML: at 12:59

## 2021-11-16 RX ADMIN — BARICITINIB 4 MG: 2 TABLET, FILM COATED ORAL at 08:15

## 2021-11-16 RX ADMIN — DEXAMETHASONE 10 MG: 4 TABLET ORAL at 08:15

## 2021-11-16 NOTE — PROGRESS NOTES
[facility]     Name: Kenia Miranda MRN: [id number]   : 1960 Hospital: [address]   Date: 2021        Impression Plan   1. Acute respiratory failure  2. Hypoxia  3. COVID 19 PNA               · Wean O2 to keep sats above 90%; on 8L  · Continue dexamethason 10 mg daily   · Cont Baricitinib  · Pt instructed to self prone 3 hrs a night  · OOB as much as possible  · Follow d-dimer  · Encourage IS use and proning  · enox 30 mg q12h             Radiology  ( personally reviewed) CTA chest: bilateral infiltrates, no PE   ABG No results for input(s): PHI, PO2I, PCO2I in the last 72 hours. Subjective     Cc: shortness of breath    63 yo with no PMHx presenting with increasing shortness of breath and weakness. COVID 19 positive. Pt states that he has been sick since 11/3. Pt had J&J vaccine 10/25/21. Non-smoker. No underlying lung problems. Currently on 10 L O2    Review of Systems:  A comprehensive review of systems was negative except for that written in the HPI. Interval History:    Afebrile  BP stable  Sats 93% on 8L midflow--improved  11/15 D-dimer 1.12--increased slightly  CRP 1.53--decreased  C.diff negative    ROS:    Feels a lot better. Denies SOB/cough. Using IS. Appetite good. No past medical history on file. No past surgical history on file. Prior to Admission medications    Medication Sig Start Date End Date Taking? Authorizing Provider   albuterol (ProAir HFA) 90 mcg/actuation inhaler Take 1 Puff by inhalation every four (4) hours as needed for Wheezing.  21  Yes Ning Mccrary NP     Current Facility-Administered Medications   Medication Dose Route Frequency    sodium chloride (NS) flush 5-40 mL  5-40 mL IntraVENous Q8H    L.acidophilus-paracasei-S.thermophil-bifidobacter (RISAQUAD) 8 billion cell capsule  1 Capsule Oral DAILY    enoxaparin (LOVENOX) injection 30 mg  30 mg SubCUTAneous Q12H    pantoprazole (PROTONIX) tablet 40 mg 40 mg Oral ACB    ascorbic acid (vitamin C) (VITAMIN C) tablet 250 mg  250 mg Oral BID    zinc sulfate (ZINCATE) 50 mg zinc (220 mg) capsule 1 Capsule  1 Capsule Oral DAILY    baricitinib (OLUMIANT) tablet 4 mg  4 mg Oral DAILY    budesonide-formoterol (SYMBICORT) 80-4.5 mcg inhaler  2 Puff Inhalation BID RT    dexAMETHasone (DECADRON) tablet 10 mg  10 mg Oral DAILY     No Known Allergies   Social History     Tobacco Use    Smoking status: Never Smoker    Smokeless tobacco: Never Used   Substance Use Topics    Alcohol use: Not on file      No family history on file. Laboratory: I have personally reviewed the critical care flowsheet and labs.      Recent Labs     11/16/21  0058 11/15/21  0418 11/14/21 0311   WBC 9.4 9.6 10.6   HGB 13.3 13.2 13.5   HCT 41.0 39.8 40.6    215 170     Recent Labs     11/16/21  0058 11/15/21  0418 11/14/21  0311    139 141   K 4.7 4.0 4.0    106 109*   CO2 26 26 25   * 155* 138*   BUN 32* 41* 44*   CREA 0.83 1.08 1.06   CA 7.8* 8.4* 8.0*   ALB 2.3* 2.4* 2.1*   ALT 40 39 40       Objective:     Mode Rate Tidal Volume Pressure FiO2 PEEP            100 %       Vital Signs:     TMAX(24)      Intake/Output:   Last shift:         Last 3 shifts: 11/16 0701 - 11/16 1900  In: -   Out: 300 [Urine:300][id number]    Intake/Output Summary (Last 24 hours) at 11/16/2021 1020  Last data filed at 11/16/2021 5404  Gross per 24 hour   Intake 800 ml   Output 1075 ml   Net -275 ml     EXAM:   GENERAL: awake, alert, on 8L midflow HFHEENT:  PERRL, EOMI, no alar flaring or epistaxis, oral mucosa moist without cyanosis, NECK:  no jugular vein distention, no retractions, no thyromegaly or masses, LUNGS: CTA, no wheezes , HEART:  Regular rate and rhythm with no MGR; no edema is present, ABDOMEN:  soft with no tenderness, bowel sounds present, EXTREMITIES:  warm with no cyanosis, SKIN:  no jaundice or ecchymosis and NEUROLOGIC:  alert and oriented, grossly non-focal    Theone [place] LUIS Ponce  [facility]

## 2021-11-16 NOTE — PROGRESS NOTES
Daily Progress Note: 11/16/2021  Nathalia Weiss Ra    Assessment/Plan:   1. Acute respiratory failure with hypoxia due to COVID-19 virus infection  - CTA negative for PE  - D-dimer elevated but falling  - cont O2 as needed  - Pulmonary consulted    2. Sepsis due to COVID-19 virus infection. - on decadron and baricitinib  - Pulm following  - CRP highly elevated but falling    3. Bacterial pneumonia. This is superimposed on the COVID-19 virus infection. - procalcitonin elevated, already treated with azithromycin as an outpatient, not currently on antibiotics (stopped by Dr. David Hood)     4. Hyperglycemia. The patient has no history of diabetes. We will check hemoglobin A1c level  - A1c 5.6    5. Other issues. Code status, the patient is a full code. We will place the patient on Lovenox for DVT prophylaxis as per COVID-19 virus infection patient protocol. Problem List:  Problem List as of 11/16/2021 Date Reviewed: 11/12/2021          Codes Class Noted - Resolved    Pneumonia due to COVID-19 virus ICD-10-CM: U07.1, J12.82  ICD-9-CM: 480.8, 079.89  11/12/2021 - Present        Sepsis ([facility] ICD-10-CM: A41.9  ICD-9-CM: 038.9, 995.91  11/12/2021 - Present        Fever ICD-10-CM: R50.9  ICD-9-CM: 780.60  11/12/2021 - Present        Sinus tachycardia ICD-10-CM: R00.0  ICD-9-CM: 427.89  11/12/2021 - Present        * (Principal) Acute respiratory failure with hypoxia ([facility] ICD-10-CM: J96.01  ICD-9-CM: 518.81  11/11/2021 - Present              Subjective: This is a 68-year-old man with no significant past medical history who was in his usual state of health until a few days ago when the patient developed body aches and pain as well as shortness of breath. The shortness of breath is associated with cough which is nonproductive. The patient was seen in the emergency room and tested positive for COVID. The patient came back to the emergency room because of worsening shortness of breath. The shortness of breath is worse with ambulation. The patient was found to have oxygen saturation of 88% at rest.  He was placed on supplemental oxygen in the emergency room and the oxygen level improved to 92%. The repeated chest x-ray of the patient shows evidence of multifocal pneumonia. The patient was referred to the hospitalist service for evaluation for admission. No prior history of respiratory disease. The patient stated that he is fully vaccinated against COVID-19 virus infection. No record of prior admission to this hospital.  The patient has no history of heart disease as well. (Dr. Yoshi Murcia)    : Sats were 80-90% while is was in the room, staying mainly at 88% but desats with breathing. Reports SOB with minimal exertion, not much cough right now. Feels very fatigued. : Sats in low 90s on 15 LPM.  Reports continued SOB with minimal exertion, continued fatigue. Feels about the same as yesterday, glad he finally made it to a room. 11/15: Still c/o cough and feels SOB but improved from yesterday he feels. Weaned down to 25L high flow this AM.     : Patient feeling okay today. Productive cough, minimal shortness of breath. On 8L of mid flow this AM. CRP trending down. Review of Systems:   A comprehensive review of systems was negative except for that written in the HPI. Objective:   Physical Exam:     Visit Vitals  /78 (BP 1 Location: Left upper arm, BP Patient Position: At rest)   Pulse 76   Temp 98.3 °F (36.8 °C)   Resp 20   Ht 5' 9\" (1.753 m)   Wt 86.6 kg (191 lb)   SpO2 94%   BMI 28.21 kg/m²    O2 Flow Rate (L/min): 8 l/min O2 Device: Hi flow nasal cannula (midlfow)  Temp (24hrs), Av.2 °F (36.8 °C), Min:98.1 °F (36.7 °C), Max:98.3 °F (36.8 °C)    No intake/output data recorded.  1901 -  0700  In: 1480 [P.O.:1480]  Out: 1638 [Urine:1275]    General:  Alert, cooperative, no distress, appears stated age.    Head:  Normocephalic, without obvious abnormality, atraumatic. Eyes:  Conjunctivae/corneas clear. PERRL, EOMs intact. Nose: High flow in place   Throat: Lips, mucosa, and tongue moist..   Neck: Supple, symmetrical, trachea midline, no adenopathy, thyroid: no enlargement/tenderness/nodules, no carotid bruit and no JVD. Back:   Symmetric, no curvature. ROM normal. No CVA tenderness. Lungs:   Scattered rales bilaterally in lower lobes   Chest wall:  No tenderness or deformity. Heart:  Regular rate and rhythm, S1, S2 normal, no murmur, click, rub or gallop. Abdomen:   Soft, non-tender. Bowel sounds normal. No masses,  No organomegaly. Extremities: no cyanosis or edema. No calf tenderness or cords. Pulses: 2+ and symmetric all extremities. Skin: Skin color, texture, turgor normal. No rashes    Neurologic:   Alert and oriented X 3. Fine motor of hands and fingers normal.   equal.  No cogwheeling or rigidity. Gait not tested at this time. Sensation grossly normal to touch. Gross motor of extremities normal.       Data Review:     EXAM:  CTA Chest with contrast for Pulmonary Embolus 11/12/21  COMPARISON:  11/11/2021  FINDINGS:  THYROID: Unremarkable. MEDIASTINUM/SHAHRAM: 15 mm right hilar lymph node. Moderate-sized hiatal hernia  containing portion of the gastric body/antrum (essentially paraesophageal  hernia). HEART/PERICARDIUM: Unremarkable. AORTA:  No aneurysm. PULMONARY ARTERIES:No pulmonary embolism. LUNGS/PLEURA: Patchy bilateral airspace disease/groundglass opacities. No  pneumothorax or pleural effusion. INCIDENTALLY IMAGED UPPER ABDOMEN: No significant abnormality. BONES: No destructive bone lesion. ADDITIONAL COMMENTS:  N/A  IMPRESSION  1. No acute pulmonary embolus.   2. Patchy bilateral airspace disease.     Lab Results   Component Value Date/Time    C-Reactive protein 1.53 (H) 11/16/2021 12:58 AM     Recent Days:  Recent Labs     11/16/21  0058 11/15/21  0418 11/14/21  0311   WBC 9.4 9.6 10.6   HGB 13.3 13.2 13.5 HCT 41.0 39.8 40.6    215 170     Recent Labs     11/16/21  0058 11/15/21  0418 11/14/21  0311    139 141   K 4.7 4.0 4.0    106 109*   CO2 26 26 25   * 155* 138*   BUN 32* 41* 44*   CREA 0.83 1.08 1.06   CA 7.8* 8.4* 8.0*   ALB 2.3* 2.4* 2.1*   TBILI 0.7 0.6 0.5   ALT 40 39 40     No results for input(s): PH, PCO2, PO2, HCO3, FIO2 in the last 72 hours. 24 Hour Results:  Recent Results (from the past 24 hour(s))   METABOLIC PANEL, COMPREHENSIVE    Collection Time: 11/16/21 12:58 AM   Result Value Ref Range    Sodium 137 136 - 145 mmol/L    Potassium 4.7 3.5 - 5.1 mmol/L    Chloride 106 97 - 108 mmol/L    CO2 26 21 - 32 mmol/L    Anion gap 5 5 - 15 mmol/L    Glucose 137 (H) 65 - 100 mg/dL    BUN 32 (H) 6 - 20 MG/DL    Creatinine 0.83 0.70 - 1.30 MG/DL    BUN/Creatinine ratio 39 (H) 12 - 20      GFR est AA >60 >60 ml/min/1.73m2    GFR est non-AA >60 >60 ml/min/1.73m2    Calcium 7.8 (L) 8.5 - 10.1 MG/DL    Bilirubin, total 0.7 0.2 - 1.0 MG/DL    ALT (SGPT) 40 12 - 78 U/L    AST (SGOT) 30 15 - 37 U/L    Alk. phosphatase 46 45 - 117 U/L    Protein, total 5.4 (L) 6.4 - 8.2 g/dL    Albumin 2.3 (L) 3.5 - 5.0 g/dL    Globulin 3.1 2.0 - 4.0 g/dL    A-G Ratio 0.7 (L) 1.1 - 2.2     CBC WITH AUTOMATED DIFF    Collection Time: 11/16/21 12:58 AM   Result Value Ref Range    WBC 9.4 4.1 - 11.1 K/uL    RBC 4.94 4.10 - 5.70 M/uL    HGB 13.3 12.1 - 17.0 g/dL    HCT 41.0 36.6 - 50.3 %    MCV 83.0 80.0 - 99.0 FL    MCH 26.9 26.0 - 34.0 PG    MCHC 32.4 30.0 - 36.5 g/dL    RDW 14.8 (H) 11.5 - 14.5 %    PLATELET 001 315 - 463 K/uL    MPV 10.1 8.9 - 12.9 FL    NRBC 0.0 0  WBC    ABSOLUTE NRBC 0.00 0.00 - 0.01 K/uL    NEUTROPHILS 87 (H) 32 - 75 %    BAND NEUTROPHILS 4 0 - 6 %    LYMPHOCYTES 4 (L) 12 - 49 %    MONOCYTES 5 5 - 13 %    EOSINOPHILS 0 0 - 7 %    BASOPHILS 0 0 - 1 %    IMMATURE GRANULOCYTES 0 %    ABS. NEUTROPHILS 8.5 (H) 1.8 - 8.0 K/UL    ABS. LYMPHOCYTES 0.4 (L) 0.8 - 3.5 K/UL    ABS. MONOCYTES 0.5 0.0 - 1.0 K/UL    ABS. EOSINOPHILS 0.0 0.0 - 0.4 K/UL    ABS. BASOPHILS 0.0 0.0 - 0.1 K/UL    ABS. IMM. GRANS. 0.0 K/UL    DF MANUAL      RBC COMMENTS NORMOCYTIC, NORMOCHROMIC     C REACTIVE PROTEIN, QT    Collection Time: 11/16/21 12:58 AM   Result Value Ref Range    C-Reactive protein 1.53 (H) 0.00 - 0.60 mg/dL       Medications reviewed  Current Facility-Administered Medications   Medication Dose Route Frequency    sodium chloride (OCEAN) 0.65 % nasal squeeze bottle 2 Spray  2 Spray Both Nostrils Q2H PRN    sodium chloride (NS) flush 5-40 mL  5-40 mL IntraVENous Q8H    sodium chloride (NS) flush 5-40 mL  5-40 mL IntraVENous PRN    acetaminophen (TYLENOL) tablet 650 mg  650 mg Oral Q6H PRN    polyethylene glycol (MIRALAX) packet 17 g  17 g Oral DAILY PRN    L.acidophilus-paracasei-S.thermophil-bifidobacter (RISAQUAD) 8 billion cell capsule  1 Capsule Oral DAILY    enoxaparin (LOVENOX) injection 30 mg  30 mg SubCUTAneous Q12H    guaiFENesin-dextromethorphan (ROBITUSSIN DM) 100-10 mg/5 mL syrup 5 mL  5 mL Oral Q4H PRN    ondansetron (ZOFRAN ODT) tablet 4 mg  4 mg Oral Q8H PRN    pantoprazole (PROTONIX) tablet 40 mg  40 mg Oral ACB    ascorbic acid (vitamin C) (VITAMIN C) tablet 250 mg  250 mg Oral BID    zinc sulfate (ZINCATE) 50 mg zinc (220 mg) capsule 1 Capsule  1 Capsule Oral DAILY    baricitinib (OLUMIANT) tablet 4 mg  4 mg Oral DAILY    ipratropium-albuterol (COMBIVENT RESPIMAT) 20 mcg-100 mcg inhalation spray  1 Puff Inhalation Q6H PRN    budesonide-formoterol (SYMBICORT) 80-4.5 mcg inhaler  2 Puff Inhalation BID RT    dexAMETHasone (DECADRON) tablet 10 mg  10 mg Oral DAILY       Care Plan discussed with: Patient/Family and Nurse    Total time spent with patient: 30 minutes.     Mansoor Leyva MD

## 2021-11-16 NOTE — PROGRESS NOTES
Transition of Care Plan:    RUR-9%                1. Home with family assistance  2. PCP follow up  3. Pt on 6 L O2  4. Pulmonary following  5. CM following for any needs prior to d/c  HEATHER. Shahzad Aguillon RN

## 2021-11-16 NOTE — PROGRESS NOTES
Physician Progress Note      Neha Chavarria  CSN #:                  [id number]  :                       1960  ADMIT DATE:       2021 9:58 PM  [address] [place] DATE:  RESPONDING  PROVIDER #:        Renuka OTERO MD          QUERY TEXT:    Good Morning,    This patient admitted for COVID Infection. .    If possible, please document in progress notes and discharge summary if you are evaluating and/or treating: The medical record reflects the following:  Risk Factors: COVID Infection, bacterial Pneumonia,  Clinical Indicators: Dx.  COVID positive, now worsening Temp 101.2, -121-resp 30-35- procalcitonin  1.35, COVID + results  and CXR + multifocal pneumonia, creat @ 1.38---Lactic acid was not assessed  Treatment: IV Rocephin and IV doxycycyline, Blood cx, Procalcitonin ,    Thank you,  Joi Trevino, [facility], [address] [place] [address], [address] [place] [address], [address] [place] [address]  Options provided:  -- Sepsis present on admission due to COVID-19 infection  -- Sepsis not present on admission due to COVID-19 infection  -- Sepsis present on admission due to COVID-19 pneumonia  -- Sepsis not present on admission due to COVID-19 pneumonia  -- Covid-19 infection without sepsis  -- Covid-19 pneumonia without sepsis  -- Other - I will add my own diagnosis  -- Disagree - Not applicable / Not valid  -- Disagree - Clinically unable to determine / Unknown  -- Refer to Clinical Documentation Reviewer    PROVIDER RESPONSE TEXT:    This patient has sepsis which was present on admission due to COVID-19 infection.     Query created by: Ralph Waller on 2021 8:06 AM      Electronically signed by:  Eduardo Elmore MD 2021 10:18 AM

## 2021-11-16 NOTE — PROGRESS NOTES
Problem: Risk for Spread of Infection  Goal: Prevent transmission of infectious organism to others  Description: Prevent the transmission of infectious organisms to other patients, staff members, and visitors. Outcome: Progressing Towards Goal     Problem: Patient Education:  Go to Education Activity  Goal: Patient/Family Education  Outcome: Progressing Towards Goal     Problem: Falls - Risk of  Goal: *Absence of Falls  Description: Document Sergey Sol Fall Risk and appropriate interventions in the flowsheet.   Outcome: Progressing Towards Goal  Note: Fall Risk Interventions:  Mobility Interventions: Bed/chair exit alarm, Communicate number of staff needed for ambulation/transfer, Patient to call before getting OOB         Medication Interventions: Bed/chair exit alarm, Evaluate medications/consider consulting pharmacy, Patient to call before getting OOB, Teach patient to arise slowly    Elimination Interventions: Bed/chair exit alarm, Call light in reach, Patient to call for help with toileting needs, Stay With Me (per policy)              Problem: Patient Education: Go to Patient Education Activity  Goal: Patient/Family Education  Outcome: Progressing Towards Goal     Problem: Airway Clearance - Ineffective  Goal: Achieve or maintain patent airway  Outcome: Progressing Towards Goal     Problem: Gas Exchange - Impaired  Goal: Absence of hypoxia  Outcome: Progressing Towards Goal  Goal: Promote optimal lung function  Outcome: Progressing Towards Goal

## 2021-11-17 LAB
ALBUMIN SERPL-MCNC: 2.3 G/DL (ref 3.5–5)
ALBUMIN/GLOB SERPL: 0.7 {RATIO} (ref 1.1–2.2)
ALP SERPL-CCNC: 45 U/L (ref 45–117)
ALT SERPL-CCNC: 45 U/L (ref 12–78)
ANION GAP SERPL CALC-SCNC: 5 MMOL/L (ref 5–15)
AST SERPL-CCNC: 29 U/L (ref 15–37)
BASOPHILS # BLD: 0 K/UL (ref 0–0.1)
BASOPHILS NFR BLD: 0 % (ref 0–1)
BILIRUB SERPL-MCNC: 0.7 MG/DL (ref 0.2–1)
BUN SERPL-MCNC: 26 MG/DL (ref 6–20)
BUN/CREAT SERPL: 33 (ref 12–20)
CALCIUM SERPL-MCNC: 8 MG/DL (ref 8.5–10.1)
CHLORIDE SERPL-SCNC: 108 MMOL/L (ref 97–108)
CO2 SERPL-SCNC: 23 MMOL/L (ref 21–32)
CREAT SERPL-MCNC: 0.8 MG/DL (ref 0.7–1.3)
CRP SERPL-MCNC: 1 MG/DL (ref 0–0.6)
DIFFERENTIAL METHOD BLD: ABNORMAL
EOSINOPHIL # BLD: 0 K/UL (ref 0–0.4)
EOSINOPHIL NFR BLD: 0 % (ref 0–7)
ERYTHROCYTE [DISTWIDTH] IN BLOOD BY AUTOMATED COUNT: 14.8 % (ref 11.5–14.5)
GLOBULIN SER CALC-MCNC: 3.2 G/DL (ref 2–4)
GLUCOSE SERPL-MCNC: 124 MG/DL (ref 65–100)
HCT VFR BLD AUTO: 38 % (ref 36.6–50.3)
HGB BLD-MCNC: 12.4 G/DL (ref 12.1–17)
IMM GRANULOCYTES # BLD AUTO: 0.5 K/UL (ref 0–0.04)
IMM GRANULOCYTES NFR BLD AUTO: 4 % (ref 0–0.5)
LYMPHOCYTES # BLD: 0.7 K/UL (ref 0.8–3.5)
LYMPHOCYTES NFR BLD: 6 % (ref 12–49)
MCH RBC QN AUTO: 26.8 PG (ref 26–34)
MCHC RBC AUTO-ENTMCNC: 32.6 G/DL (ref 30–36.5)
MCV RBC AUTO: 82.3 FL (ref 80–99)
MONOCYTES # BLD: 1.1 K/UL (ref 0–1)
MONOCYTES NFR BLD: 9 % (ref 5–13)
NEUTS SEG # BLD: 9.5 K/UL (ref 1.8–8)
NEUTS SEG NFR BLD: 81 % (ref 32–75)
NRBC # BLD: 0 K/UL (ref 0–0.01)
NRBC BLD-RTO: 0 PER 100 WBC
PLATELET # BLD AUTO: 313 K/UL (ref 150–400)
PMV BLD AUTO: 10 FL (ref 8.9–12.9)
POTASSIUM SERPL-SCNC: 4.6 MMOL/L (ref 3.5–5.1)
PROT SERPL-MCNC: 5.5 G/DL (ref 6.4–8.2)
RBC # BLD AUTO: 4.62 M/UL (ref 4.1–5.7)
RBC MORPH BLD: ABNORMAL
SODIUM SERPL-SCNC: 136 MMOL/L (ref 136–145)
WBC # BLD AUTO: 11.8 K/UL (ref 4.1–11.1)

## 2021-11-17 PROCEDURE — 77010033711 HC HIGH FLOW OXYGEN

## 2021-11-17 PROCEDURE — 74011250636 HC RX REV CODE- 250/636: Performed by: INTERNAL MEDICINE

## 2021-11-17 PROCEDURE — 86140 C-REACTIVE PROTEIN: CPT

## 2021-11-17 PROCEDURE — 74011250637 HC RX REV CODE- 250/637: Performed by: INTERNAL MEDICINE

## 2021-11-17 PROCEDURE — 94640 AIRWAY INHALATION TREATMENT: CPT

## 2021-11-17 PROCEDURE — 85025 COMPLETE CBC W/AUTO DIFF WBC: CPT

## 2021-11-17 PROCEDURE — 36415 COLL VENOUS BLD VENIPUNCTURE: CPT

## 2021-11-17 PROCEDURE — 65660000000 HC RM CCU STEPDOWN

## 2021-11-17 PROCEDURE — 80053 COMPREHEN METABOLIC PANEL: CPT

## 2021-11-17 RX ADMIN — OXYCODONE HYDROCHLORIDE AND ACETAMINOPHEN 250 MG: 500 TABLET ORAL at 09:43

## 2021-11-17 RX ADMIN — Medication 1 CAPSULE: at 09:43

## 2021-11-17 RX ADMIN — DEXAMETHASONE 10 MG: 4 TABLET ORAL at 09:44

## 2021-11-17 RX ADMIN — PANTOPRAZOLE SODIUM 40 MG: 40 TABLET, DELAYED RELEASE ORAL at 06:37

## 2021-11-17 RX ADMIN — BUDESONIDE AND FORMOTEROL FUMARATE DIHYDRATE 2 PUFF: 80; 4.5 AEROSOL RESPIRATORY (INHALATION) at 20:33

## 2021-11-17 RX ADMIN — ENOXAPARIN SODIUM 30 MG: 100 INJECTION SUBCUTANEOUS at 12:44

## 2021-11-17 RX ADMIN — Medication 10 ML: at 17:03

## 2021-11-17 RX ADMIN — ENOXAPARIN SODIUM 30 MG: 100 INJECTION SUBCUTANEOUS at 00:05

## 2021-11-17 RX ADMIN — OXYCODONE HYDROCHLORIDE AND ACETAMINOPHEN 250 MG: 500 TABLET ORAL at 17:03

## 2021-11-17 RX ADMIN — BUDESONIDE AND FORMOTEROL FUMARATE DIHYDRATE 2 PUFF: 80; 4.5 AEROSOL RESPIRATORY (INHALATION) at 08:18

## 2021-11-17 RX ADMIN — Medication 10 ML: at 22:05

## 2021-11-17 RX ADMIN — Medication 10 ML: at 06:37

## 2021-11-17 RX ADMIN — BARICITINIB 4 MG: 2 TABLET, FILM COATED ORAL at 09:43

## 2021-11-17 NOTE — PROGRESS NOTES
Daily Progress Note: 11/17/2021  Festus Garrisonma    Assessment/Plan:   1. Acute respiratory failure with hypoxia due to COVID-19 virus infection  - CTA negative for PE  - D-dimer elevated but falling  - cont O2 as needed  - Pulmonary consulted    2. Sepsis due to COVID-19 virus infection. - on decadron and baricitinib  - Pulm following  - CRP elevated but falling    3. Bacterial pneumonia. This is superimposed on the COVID-19 virus infection. - procalcitonin elevated, already treated with azithromycin as an outpatient, not currently on antibiotics (stopped by Dr. Colby Rice)     4. Hyperglycemia. The patient has no history of diabetes. We will check hemoglobin A1c level  - A1c 5.6       Problem List:  Problem List as of 11/17/2021 Date Reviewed: 11/12/2021          Codes Class Noted - Resolved    Pneumonia due to COVID-19 virus ICD-10-CM: U07.1, J12.82  ICD-9-CM: 480.8, 079.89  11/12/2021 - Present        Sepsis ([facility] ICD-10-CM: A41.9  ICD-9-CM: 038.9, 995.91  11/12/2021 - Present        Fever ICD-10-CM: R50.9  ICD-9-CM: 780.60  11/12/2021 - Present        Sinus tachycardia ICD-10-CM: R00.0  ICD-9-CM: 427.89  11/12/2021 - Present        * (Principal) Acute respiratory failure with hypoxia ([facility] ICD-10-CM: J96.01  ICD-9-CM: 518.81  11/11/2021 - Present            Subjective: This is a 69-year-old man with no significant past medical history who was in his usual state of health until a few days ago when the patient developed body aches and pain as well as shortness of breath. The shortness of breath is associated with cough which is nonproductive. The patient was seen in the emergency room and tested positive for COVID. The patient came back to the emergency room because of worsening shortness of breath. The shortness of breath is worse with ambulation.   The patient was found to have oxygen saturation of 88% at rest.  He was placed on supplemental oxygen in the emergency room and the oxygen level improved to 92%. The repeated chest x-ray of the patient shows evidence of multifocal pneumonia. The patient was referred to the hospitalist service for evaluation for admission. No prior history of respiratory disease. The patient stated that he is fully vaccinated against COVID-19 virus infection. No record of prior admission to this hospital.  The patient has no history of heart disease as well. (Dr. Rinaldi Left)    : Sats were 80-90% while is was in the room, staying mainly at 88% but desats with breathing. Reports SOB with minimal exertion, not much cough right now. Feels very fatigued. : Sats in low 90s on 15 LPM.  Reports continued SOB with minimal exertion, continued fatigue. Feels about the same as yesterday, glad he finally made it to a room. 11/15: Still c/o cough and feels SOB but improved from yesterday he feels. Weaned down to 25L high flow this AM.     : Patient feeling okay today. Productive cough, minimal shortness of breath. On 8L of mid flow this AM. CRP trending down. : On 6L NC.  Gradually reports feeling better. Main complaint is the Neg pressure flow machine is \"too loud. \"   Try to wean O2 further. Review of Systems:   A comprehensive review of systems was negative except for that written in the HPI. Objective:   Physical Exam:     Visit Vitals  BP (!) 107/59 (BP 1 Location: Left upper arm, BP Patient Position: At rest)   Pulse 68   Temp 98.1 °F (36.7 °C)   Resp 20   Ht 5' 9\" (1.753 m)   Wt 191 lb (86.6 kg)   SpO2 92%   BMI 28.21 kg/m²    O2 Flow Rate (L/min): 6 l/min O2 Device: Nasal cannula  Temp (24hrs), Av °F (36.7 °C), Min:97.2 °F (36.2 °C), Max:98.4 °F (36.9 °C)    1901 -  0700  In: 240 [P.O.:240]  Out: 575 [Urine:575]   11/15 07 -  1900  In: 1600 [P.O.:1600]  Out: 5048 [Urine:2475]    General:  Alert, cooperative, no distress, appears stated age.    Head:  Normocephalic, without obvious abnormality, atraumatic. Eyes:  Conjunctivae/corneas clear. PERRL, EOMs intact. Nose: High flow in place   Throat: Lips, mucosa, and tongue moist..   Neck: Supple, symmetrical, trachea midline, no adenopathy, thyroid: no enlargement/tenderness/nodules, no carotid bruit and no JVD. Back:   Symmetric, no curvature. ROM normal. No CVA tenderness. Lungs:   Scattered rales bilaterally in lower lobes   Chest wall:  No tenderness or deformity. Heart:  Regular rate and rhythm, S1, S2 normal, no murmur, click, rub or gallop. Abdomen:   Soft, non-tender. Bowel sounds normal. No masses,  No organomegaly. Extremities: no cyanosis or edema. No calf tenderness or cords. Pulses: 2+ and symmetric all extremities. Skin: Skin color, texture, turgor normal. No rashes    Neurologic:   Alert and oriented X 3. Fine motor of hands and fingers normal.   equal.  No cogwheeling or rigidity. Gait not tested at this time. Sensation grossly normal to touch. Gross motor of extremities normal.       Data Review:     EXAM:  CTA Chest with contrast for Pulmonary Embolus 11/12/21  COMPARISON:  11/11/2021  FINDINGS:  THYROID: Unremarkable. MEDIASTINUM/SHAHRAM: 15 mm right hilar lymph node. Moderate-sized hiatal hernia  containing portion of the gastric body/antrum (essentially paraesophageal  hernia). HEART/PERICARDIUM: Unremarkable. AORTA:  No aneurysm. PULMONARY ARTERIES:No pulmonary embolism. LUNGS/PLEURA: Patchy bilateral airspace disease/groundglass opacities. No  pneumothorax or pleural effusion. INCIDENTALLY IMAGED UPPER ABDOMEN: No significant abnormality. BONES: No destructive bone lesion. ADDITIONAL COMMENTS:  N/A  IMPRESSION  1. No acute pulmonary embolus.   2. Patchy bilateral airspace disease.     Lab Results   Component Value Date/Time    C-Reactive protein 1.00 (H) 11/17/2021 12:04 AM     Recent Days:  Recent Labs     11/17/21  0004 11/16/21  0058 11/15/21  0418   WBC 11.8* 9.4 9.6   HGB 12.4 13.3 13.2   HCT 38.0 41.0 39.8    280 215     Recent Labs     11/17/21  0004 11/16/21  0058 11/15/21  0418    137 139   K 4.6 4.7 4.0    106 106   CO2 23 26 26   * 137* 155*   BUN 26* 32* 41*   CREA 0.80 0.83 1.08   CA 8.0* 7.8* 8.4*   ALB 2.3* 2.3* 2.4*   TBILI 0.7 0.7 0.6   ALT 45 40 39     No results for input(s): PH, PCO2, PO2, HCO3, FIO2 in the last 72 hours. 24 Hour Results:  Recent Results (from the past 24 hour(s))   METABOLIC PANEL, COMPREHENSIVE    Collection Time: 11/17/21 12:04 AM   Result Value Ref Range    Sodium 136 136 - 145 mmol/L    Potassium 4.6 3.5 - 5.1 mmol/L    Chloride 108 97 - 108 mmol/L    CO2 23 21 - 32 mmol/L    Anion gap 5 5 - 15 mmol/L    Glucose 124 (H) 65 - 100 mg/dL    BUN 26 (H) 6 - 20 MG/DL    Creatinine 0.80 0.70 - 1.30 MG/DL    BUN/Creatinine ratio 33 (H) 12 - 20      GFR est AA >60 >60 ml/min/1.73m2    GFR est non-AA >60 >60 ml/min/1.73m2    Calcium 8.0 (L) 8.5 - 10.1 MG/DL    Bilirubin, total 0.7 0.2 - 1.0 MG/DL    ALT (SGPT) 45 12 - 78 U/L    AST (SGOT) 29 15 - 37 U/L    Alk. phosphatase 45 45 - 117 U/L    Protein, total 5.5 (L) 6.4 - 8.2 g/dL    Albumin 2.3 (L) 3.5 - 5.0 g/dL    Globulin 3.2 2.0 - 4.0 g/dL    A-G Ratio 0.7 (L) 1.1 - 2.2     CBC WITH AUTOMATED DIFF    Collection Time: 11/17/21 12:04 AM   Result Value Ref Range    WBC 11.8 (H) 4.1 - 11.1 K/uL    RBC 4.62 4.10 - 5.70 M/uL    HGB 12.4 12.1 - 17.0 g/dL    HCT 38.0 36.6 - 50.3 %    MCV 82.3 80.0 - 99.0 FL    MCH 26.8 26.0 - 34.0 PG    MCHC 32.6 30.0 - 36.5 g/dL    RDW 14.8 (H) 11.5 - 14.5 %    PLATELET 116 259 - 249 K/uL    MPV 10.0 8.9 - 12.9 FL    NRBC 0.0 0  WBC    ABSOLUTE NRBC 0.00 0.00 - 0.01 K/uL    NEUTROPHILS 81 (H) 32 - 75 %    LYMPHOCYTES 6 (L) 12 - 49 %    MONOCYTES 9 5 - 13 %    EOSINOPHILS 0 0 - 7 %    BASOPHILS 0 0 - 1 %    IMMATURE GRANULOCYTES 4 (H) 0.0 - 0.5 %    ABS. NEUTROPHILS 9.5 (H) 1.8 - 8.0 K/UL    ABS. LYMPHOCYTES 0.7 (L) 0.8 - 3.5 K/UL    ABS.  MONOCYTES 1.1 (H) 0.0 - 1.0 K/UL    ABS. EOSINOPHILS 0.0 0.0 - 0.4 K/UL    ABS. BASOPHILS 0.0 0.0 - 0.1 K/UL    ABS. IMM. GRANS. 0.5 (H) 0.00 - 0.04 K/UL    DF SMEAR SCANNED      RBC COMMENTS NORMOCYTIC, NORMOCHROMIC     C REACTIVE PROTEIN, QT    Collection Time: 11/17/21 12:04 AM   Result Value Ref Range    C-Reactive protein 1.00 (H) 0.00 - 0.60 mg/dL       Medications reviewed  Current Facility-Administered Medications   Medication Dose Route Frequency    sodium chloride (OCEAN) 0.65 % nasal squeeze bottle 2 Spray  2 Spray Both Nostrils Q2H PRN    sodium chloride (NS) flush 5-40 mL  5-40 mL IntraVENous Q8H    sodium chloride (NS) flush 5-40 mL  5-40 mL IntraVENous PRN    acetaminophen (TYLENOL) tablet 650 mg  650 mg Oral Q6H PRN    polyethylene glycol (MIRALAX) packet 17 g  17 g Oral DAILY PRN    L.acidophilus-paracasei-S.thermophil-bifidobacter (RISAQUAD) 8 billion cell capsule  1 Capsule Oral DAILY    enoxaparin (LOVENOX) injection 30 mg  30 mg SubCUTAneous Q12H    guaiFENesin-dextromethorphan (ROBITUSSIN DM) 100-10 mg/5 mL syrup 5 mL  5 mL Oral Q4H PRN    ondansetron (ZOFRAN ODT) tablet 4 mg  4 mg Oral Q8H PRN    pantoprazole (PROTONIX) tablet 40 mg  40 mg Oral ACB    ascorbic acid (vitamin C) (VITAMIN C) tablet 250 mg  250 mg Oral BID    zinc sulfate (ZINCATE) 50 mg zinc (220 mg) capsule 1 Capsule  1 Capsule Oral DAILY    baricitinib (OLUMIANT) tablet 4 mg  4 mg Oral DAILY    ipratropium-albuterol (COMBIVENT RESPIMAT) 20 mcg-100 mcg inhalation spray  1 Puff Inhalation Q6H PRN    budesonide-formoterol (SYMBICORT) 80-4.5 mcg inhaler  2 Puff Inhalation BID RT    dexAMETHasone (DECADRON) tablet 10 mg  10 mg Oral DAILY       Care Plan discussed with: Patient/Family and Nurse    Total time spent with patient: 30 minutes.     Erlin Melo MD

## 2021-11-17 NOTE — PROGRESS NOTES
Spiritual Care Assessment/Progress Note  [address]      NAME: Carroll Motta      MRN: [id number]  AGE: 64 y.o. SEX: male  Mormon Affiliation: Unknown   Language: English     11/17/2021     Total Time (in minutes): 8     Spiritual Assessment begun in [facility] 3 PRO CARE TELE 2 through conversation with:         []Patient        [] Family    [] Friend(s)        Reason for Consult: Initial/Spiritual assessment, patient floor     Spiritual beliefs: (Please include comment if needed)     [] Identifies with a woody tradition:         [] Supported by a woody community:            [] Claims no spiritual orientation:           [] Seeking spiritual identity:                [] Adheres to an individual form of spirituality:           [x] Not able to assess:                           Identified resources for coping:      [] Prayer                               [] Music                  [] Guided Imagery     [] Family/friends                 [] Pet visits     [] Devotional reading                         [x] Unknown     [] Other:                                           Interventions offered during this visit: (See comments for more details)    Patient Interventions:  Other (comment) (Unable to assess)           Plan of Care:     [] Support spiritual and/or cultural needs    [] Support AMD and/or advance care planning process      [] Support grieving process   [] Coordinate Rites and/or Rituals    [] Coordination with community clergy   [] No spiritual needs identified at this time   [] Detailed Plan of Care below (See Comments)  [] Make referral to Music Therapy  [] Make referral to Pet Therapy     [] Make referral to Addiction services  [] Make referral to [facility]  [] Make referral to Spiritual Care Partner  [] No future visits requested        [x] Contact Spiritual Care for further referrals     Comments: After conducting chart review,  attempted to visit Mr. Nicholas Tolentino for an initial spiritual assessment on the Prog. Care Tele unit. Mr. Margarette Campbell is on Covid+ contact precautions, unable to assess at this time.  also unable to consult with his nurse. 's are available for further support upon referral  Ilana Ovalles. uJan Villanueva.      Paging Service: [phone])

## 2021-11-17 NOTE — PROGRESS NOTES
1900- Bedside and Verbal shift change report given to Marsha Garcia (oncoming nurse) by Miroslava/Alexys (offgoing nurse). Report included the following information SBAR, Intake/Output, Recent Results and Cardiac Rhythm NSR. This patient was assisted with Intentional Toileting every 2 hours during this shift as appropriate. Documentation of ambulation and output reflected on Flowsheet as appropriate. Purposeful hourly rounding was completed using AIDET and 5Ps. Outcomes of PHR documented as they occurred. Bed alarm in use as appropriate. Dual Suction and ambubag in place. 0730- Bedside and Verbal shift change report given to Alexys (oncoming nurse) by Marsha Garcia (offgoing nurse). Report included the following information SBAR, Intake/Output, Recent Results and Cardiac Rhythm NSR.

## 2021-11-17 NOTE — PROGRESS NOTES
11/17/2021  12:15 PM  Transition of Care Plan:   COVID 19+  RUR-9%   LOS 6 Days               1. Medical management continues  2. Pulmonary following   3. Pt on 5 L O2, wean as tolerated, follow for home O2 needs at DC  4. CM following for any needs prior to d/c  5. DC when medically stable to home w/ family assistance, pt lives w/ spouse, independent w/ ADLS, no DME  6. Outpatient f/u PCP, pulmonary  7.  Family will transport at [address] [place] [address]

## 2021-11-17 NOTE — PROGRESS NOTES
[facility]     Name: Renu Hunter MRN: [id number]   : 1960 Hospital: [address]   Date: 2021        Impression Plan   1. Acute respiratory failure  2. Hypoxia  3. COVID 19 PNA               · Wean O2 to keep sats above 90%; weaned to 5L NC  · Continue dexamethason 10 mg daily   · Cont Baricitinib  · Pt instructed to self prone 3 hrs a night  · OOB as much as possible  · Follow d-dimer  · Encourage IS use and proning  · enox 30 mg q12h             Radiology  ( personally reviewed) CTA chest: bilateral infiltrates, no PE   ABG No results for input(s): PHI, PO2I, PCO2I in the last 72 hours. Subjective     Cc: shortness of breath    65 yo with no PMHx presenting with increasing shortness of breath and weakness. COVID 19 positive. Pt states that he has been sick since 11/3. Pt had J&J vaccine 10/25/21. Non-smoker. No underlying lung problems. Currently on 10 L O2    Review of Systems:  A comprehensive review of systems was negative except for that written in the HPI. Interval History:    Afebrile  BP stable  Sats 96% on 6L midflow--improved  WBC 11.8--increased  11/15 D-dimer 1.12--increased slightly  CRP 1.00--decreased  C.diff negative    ROS:    Feels a lot better. Denies SOB/cough. Using IS. Appetite good. No past medical history on file. No past surgical history on file. Prior to Admission medications    Medication Sig Start Date End Date Taking? Authorizing Provider   albuterol (ProAir HFA) 90 mcg/actuation inhaler Take 1 Puff by inhalation every four (4) hours as needed for Wheezing.  21  Yes Natan Mccrary, NP     Current Facility-Administered Medications   Medication Dose Route Frequency    sodium chloride (NS) flush 5-40 mL  5-40 mL IntraVENous Q8H    L.acidophilus-paracasei-S.thermophil-bifidobacter (RISAQUAD) 8 billion cell capsule  1 Capsule Oral DAILY    enoxaparin (LOVENOX) injection 30 mg  30 mg SubCUTAneous Q12H    pantoprazole (PROTONIX) tablet 40 mg  40 mg Oral ACB    ascorbic acid (vitamin C) (VITAMIN C) tablet 250 mg  250 mg Oral BID    zinc sulfate (ZINCATE) 50 mg zinc (220 mg) capsule 1 Capsule  1 Capsule Oral DAILY    baricitinib (OLUMIANT) tablet 4 mg  4 mg Oral DAILY    budesonide-formoterol (SYMBICORT) 80-4.5 mcg inhaler  2 Puff Inhalation BID RT    dexAMETHasone (DECADRON) tablet 10 mg  10 mg Oral DAILY     No Known Allergies   Social History     Tobacco Use    Smoking status: Never Smoker    Smokeless tobacco: Never Used   Substance Use Topics    Alcohol use: Not on file      No family history on file. Laboratory: I have personally reviewed the critical care flowsheet and labs.      Recent Labs     11/17/21  0004 11/16/21  0058 11/15/21  0418   WBC 11.8* 9.4 9.6   HGB 12.4 13.3 13.2   HCT 38.0 41.0 39.8    280 215     Recent Labs     11/17/21  0004 11/16/21 0058 11/15/21  0418    137 139   K 4.6 4.7 4.0    106 106   CO2 23 26 26   * 137* 155*   BUN 26* 32* 41*   CREA 0.80 0.83 1.08   CA 8.0* 7.8* 8.4*   ALB 2.3* 2.3* 2.4*   ALT 45 40 39       Objective:     Mode Rate Tidal Volume Pressure FiO2 PEEP            94 %       Vital Signs:     TMAX(24)      Intake/Output:   Last shift:         Last 3 shifts: 11/17 0701 - 11/17 1900  In: -   Out: 350 [Urine:350][id number]    Intake/Output Summary (Last 24 hours) at 11/17/2021 1041  Last data filed at 11/17/2021 0911  Gross per 24 hour   Intake 840 ml   Output 2725 ml   Net -1885 ml     EXAM:   GENERAL: awake, alert, on 8L midflow HFHEENT:  PERRL, EOMI, no alar flaring or epistaxis, oral mucosa moist without cyanosis, NECK:  no jugular vein distention, no retractions, no thyromegaly or masses, LUNGS: CTA, no wheezes , HEART:  Regular rate and rhythm with no MGR; no edema is present, ABDOMEN:  soft with no tenderness, bowel sounds present, EXTREMITIES:  warm with no cyanosis, SKIN:  no jaundice or ecchymosis and NEUROLOGIC:  alert and oriented, grossly non-focal    Wandy Reece, NP  [facility]

## 2021-11-18 VITALS
BODY MASS INDEX: 28.29 KG/M2 | WEIGHT: 191 LBS | RESPIRATION RATE: 22 BRPM | DIASTOLIC BLOOD PRESSURE: 73 MMHG | TEMPERATURE: 96.8 F | HEIGHT: 69 IN | SYSTOLIC BLOOD PRESSURE: 101 MMHG | HEART RATE: 91 BPM | OXYGEN SATURATION: 94 %

## 2021-11-18 LAB
ALBUMIN SERPL-MCNC: 2.4 G/DL (ref 3.5–5)
ALBUMIN/GLOB SERPL: 0.7 {RATIO} (ref 1.1–2.2)
ALP SERPL-CCNC: 48 U/L (ref 45–117)
ALT SERPL-CCNC: 61 U/L (ref 12–78)
ANION GAP SERPL CALC-SCNC: 4 MMOL/L (ref 5–15)
AST SERPL-CCNC: 37 U/L (ref 15–37)
BASOPHILS # BLD: 0 K/UL (ref 0–0.1)
BASOPHILS NFR BLD: 0 % (ref 0–1)
BILIRUB SERPL-MCNC: 0.6 MG/DL (ref 0.2–1)
BUN SERPL-MCNC: 24 MG/DL (ref 6–20)
BUN/CREAT SERPL: 29 (ref 12–20)
CALCIUM SERPL-MCNC: 8.1 MG/DL (ref 8.5–10.1)
CHLORIDE SERPL-SCNC: 107 MMOL/L (ref 97–108)
CO2 SERPL-SCNC: 25 MMOL/L (ref 21–32)
CREAT SERPL-MCNC: 0.84 MG/DL (ref 0.7–1.3)
CRP SERPL-MCNC: 2.74 MG/DL (ref 0–0.6)
DIFFERENTIAL METHOD BLD: ABNORMAL
EOSINOPHIL # BLD: 0 K/UL (ref 0–0.4)
EOSINOPHIL NFR BLD: 0 % (ref 0–7)
ERYTHROCYTE [DISTWIDTH] IN BLOOD BY AUTOMATED COUNT: 14.5 % (ref 11.5–14.5)
GLOBULIN SER CALC-MCNC: 3.4 G/DL (ref 2–4)
GLUCOSE SERPL-MCNC: 114 MG/DL (ref 65–100)
HCT VFR BLD AUTO: 40.1 % (ref 36.6–50.3)
HGB BLD-MCNC: 13 G/DL (ref 12.1–17)
IMM GRANULOCYTES # BLD AUTO: 0.2 K/UL (ref 0–0.04)
IMM GRANULOCYTES NFR BLD AUTO: 5 % (ref 0–0.5)
LYMPHOCYTES # BLD: 0.6 K/UL (ref 0.8–3.5)
LYMPHOCYTES NFR BLD: 11 % (ref 12–49)
MCH RBC QN AUTO: 27.1 PG (ref 26–34)
MCHC RBC AUTO-ENTMCNC: 32.4 G/DL (ref 30–36.5)
MCV RBC AUTO: 83.5 FL (ref 80–99)
MONOCYTES # BLD: 0.7 K/UL (ref 0–1)
MONOCYTES NFR BLD: 15 % (ref 5–13)
NEUTS SEG # BLD: 3.3 K/UL (ref 1.8–8)
NEUTS SEG NFR BLD: 69 % (ref 32–75)
NRBC # BLD: 0 K/UL (ref 0–0.01)
NRBC BLD-RTO: 0 PER 100 WBC
PLATELET # BLD AUTO: 379 K/UL (ref 150–400)
PMV BLD AUTO: 9.8 FL (ref 8.9–12.9)
POTASSIUM SERPL-SCNC: 4.9 MMOL/L (ref 3.5–5.1)
PROT SERPL-MCNC: 5.8 G/DL (ref 6.4–8.2)
RBC # BLD AUTO: 4.8 M/UL (ref 4.1–5.7)
SODIUM SERPL-SCNC: 136 MMOL/L (ref 136–145)
WBC # BLD AUTO: 4.8 K/UL (ref 4.1–11.1)

## 2021-11-18 PROCEDURE — 94618 PULMONARY STRESS TESTING: CPT

## 2021-11-18 PROCEDURE — 74011250636 HC RX REV CODE- 250/636: Performed by: INTERNAL MEDICINE

## 2021-11-18 PROCEDURE — 80053 COMPREHEN METABOLIC PANEL: CPT

## 2021-11-18 PROCEDURE — 36415 COLL VENOUS BLD VENIPUNCTURE: CPT

## 2021-11-18 PROCEDURE — 86140 C-REACTIVE PROTEIN: CPT

## 2021-11-18 PROCEDURE — 74011250637 HC RX REV CODE- 250/637: Performed by: INTERNAL MEDICINE

## 2021-11-18 PROCEDURE — 85025 COMPLETE CBC W/AUTO DIFF WBC: CPT

## 2021-11-18 PROCEDURE — 77010033678 HC OXYGEN DAILY

## 2021-11-18 PROCEDURE — 94640 AIRWAY INHALATION TREATMENT: CPT

## 2021-11-18 PROCEDURE — 94664 DEMO&/EVAL PT USE INHALER: CPT

## 2021-11-18 PROCEDURE — 94760 N-INVAS EAR/PLS OXIMETRY 1: CPT

## 2021-11-18 PROCEDURE — 77010033711 HC HIGH FLOW OXYGEN

## 2021-11-18 RX ORDER — PANTOPRAZOLE SODIUM 40 MG/1
40 TABLET, DELAYED RELEASE ORAL
Qty: 30 TABLET | Refills: 0 | Status: SHIPPED | OUTPATIENT
Start: 2021-11-19

## 2021-11-18 RX ORDER — ACETAMINOPHEN 325 MG/1
650 TABLET ORAL
Qty: 30 TABLET | Refills: 0 | Status: SHIPPED | OUTPATIENT
Start: 2021-11-18

## 2021-11-18 RX ORDER — DEXAMETHASONE 4 MG/1
TABLET ORAL
Qty: 8 TABLET | Refills: 0 | Status: SHIPPED | OUTPATIENT
Start: 2021-11-19

## 2021-11-18 RX ORDER — BUDESONIDE AND FORMOTEROL FUMARATE DIHYDRATE 80; 4.5 UG/1; UG/1
2 AEROSOL RESPIRATORY (INHALATION) 2 TIMES DAILY
Qty: 10.2 G | Refills: 0 | Status: SHIPPED | OUTPATIENT
Start: 2021-11-18

## 2021-11-18 RX ORDER — ASCORBIC ACID 250 MG
250 TABLET ORAL 2 TIMES DAILY
Qty: 30 TABLET | Refills: 0 | Status: SHIPPED
Start: 2021-11-18

## 2021-11-18 RX ORDER — ZINC SULFATE 50(220)MG
1 CAPSULE ORAL DAILY
Qty: 30 CAPSULE | Refills: 0 | Status: SHIPPED
Start: 2021-11-19

## 2021-11-18 RX ADMIN — DEXAMETHASONE 10 MG: 4 TABLET ORAL at 09:31

## 2021-11-18 RX ADMIN — PANTOPRAZOLE SODIUM 40 MG: 40 TABLET, DELAYED RELEASE ORAL at 06:25

## 2021-11-18 RX ADMIN — Medication 10 ML: at 05:16

## 2021-11-18 RX ADMIN — OXYCODONE HYDROCHLORIDE AND ACETAMINOPHEN 250 MG: 500 TABLET ORAL at 09:31

## 2021-11-18 RX ADMIN — ENOXAPARIN SODIUM 30 MG: 100 INJECTION SUBCUTANEOUS at 12:51

## 2021-11-18 RX ADMIN — Medication 1 CAPSULE: at 09:31

## 2021-11-18 RX ADMIN — BUDESONIDE AND FORMOTEROL FUMARATE DIHYDRATE 2 PUFF: 80; 4.5 AEROSOL RESPIRATORY (INHALATION) at 07:24

## 2021-11-18 RX ADMIN — ENOXAPARIN SODIUM 30 MG: 100 INJECTION SUBCUTANEOUS at 02:02

## 2021-11-18 RX ADMIN — BARICITINIB 4 MG: 2 TABLET, FILM COATED ORAL at 09:31

## 2021-11-18 NOTE — PROGRESS NOTES
Daily Progress Note and Discharge Note: 11/18/2021  Nathalia Aguilar    Assessment/Plan:   1. Acute respiratory failure with hypoxia due to COVID-19 virus infection  - CTA negative for PE  - D-dimer elevated but falling  - cont O2 as needed - 6 min walk today shows home O2 need at 2 liters  - Pulmonary consulted    2. Sepsis due to COVID-19 virus infection. - on decadron and baricitinib inpt - Cont decadron outpt  - Pulm followed. - CRP elevated but falling    3. Bacterial pneumonia. This is superimposed on the COVID-19 virus infection. - procalcitonin elevated, already treated with azithromycin as an outpatient, not currently on antibiotics (stopped by Dr. Sanjuanita Becerril)     4. Hyperglycemia. The patient has no history of diabetes. We will check hemoglobin A1c level  - A1c 5.6       Problem List:  Problem List as of 11/18/2021 Date Reviewed: 11/12/2021          Codes Class Noted - Resolved    Pneumonia due to COVID-19 virus ICD-10-CM: U07.1, J12.82  ICD-9-CM: 480.8, 079.89  11/12/2021 - Present        Sepsis ([facility] ICD-10-CM: A41.9  ICD-9-CM: 038.9, 995.91  11/12/2021 - Present        Fever ICD-10-CM: R50.9  ICD-9-CM: 780.60  11/12/2021 - Present        Sinus tachycardia ICD-10-CM: R00.0  ICD-9-CM: 427.89  11/12/2021 - Present        * (Principal) Acute respiratory failure with hypoxia ([facility] ICD-10-CM: J96.01  ICD-9-CM: 518.81  11/11/2021 - Present            Subjective: This is a 28-year-old man with no significant past medical history who was in his usual state of health until a few days ago when the patient developed body aches and pain as well as shortness of breath. The shortness of breath is associated with cough which is nonproductive. The patient was seen in the emergency room and tested positive for COVID. The patient came back to the emergency room because of worsening shortness of breath. The shortness of breath is worse with ambulation.   The patient was found to have oxygen saturation of 88% at rest.  He was placed on supplemental oxygen in the emergency room and the oxygen level improved to 92%. The repeated chest x-ray of the patient shows evidence of multifocal pneumonia. The patient was referred to the hospitalist service for evaluation for admission. No prior history of respiratory disease. The patient stated that he is fully vaccinated against COVID-19 virus infection. No record of prior admission to this hospital.  The patient has no history of heart disease as well. (Dr. Char Sarmiento)    11/13: Sats were 80-90% while is was in the room, staying mainly at 88% but desats with breathing. Reports SOB with minimal exertion, not much cough right now. Feels very fatigued. 11/14: Sats in low 90s on 15 LPM.  Reports continued SOB with minimal exertion, continued fatigue. Feels about the same as yesterday, glad he finally made it to a room. 11/15: Still c/o cough and feels SOB but improved from yesterday he feels. Weaned down to 25L high flow this AM.     11/16: Patient feeling okay today. Productive cough, minimal shortness of breath. On 8L of mid flow this AM. CRP trending down. 11/17: On 6L NC.  Gradually reports feeling better. Main complaint is the Neg pressure flow machine is \"too loud. \"   Try to wean O2 further. 11/18: On 2L NC. Felling much better. Will get 6 minute walk today to check for home O2 need. Patient has no SOB currently. May be able to go home later today and he would like that. 940AM:  6 min walk completed and pt needs 2 liters home O2 - ordered. Pt would like to go home today. Advised follow up at our offices tomorrow or early next wk. Cont Decadron until DCed by PCP or Pul at follow up. Review of Systems:   A comprehensive review of systems was negative except for that written in the HPI.     Objective:   Physical Exam:     Visit Vitals  /73 (BP 1 Location: Left upper arm, BP Patient Position: At rest)   Pulse 71 Temp 97.8 °F (36.6 °C)   Resp 18   Ht 5' 9\" (1.753 m)   Wt 191 lb (86.6 kg)   SpO2 91%   BMI 28.21 kg/m²    O2 Flow Rate (L/min): 2 l/min O2 Device: Nasal cannula  Temp (24hrs), Av.1 °F (36.7 °C), Min:97.8 °F (36.6 °C), Max:98.5 °F (36.9 °C)    1901 -  0700  In: 200 [P.O.:200]  Out: 700 [Urine:700]    07 -  190  In: 4956 [P.O.:1480]  Out: 0311 [Urine:4395]    General:  Alert, cooperative, no distress, appears stated age. Head:  Normocephalic, without obvious abnormality, atraumatic. Eyes:  Conjunctivae/corneas clear. PERRL, EOMs intact. Nose: High flow in place   Throat: Lips, mucosa, and tongue moist..   Neck: Supple, symmetrical, trachea midline, no adenopathy, thyroid: no enlargement/tenderness/nodules, no carotid bruit and no JVD. Back:   Symmetric, no curvature. ROM normal. No CVA tenderness. Lungs:   Scattered rales bilaterally in lower lobes   Chest wall:  No tenderness or deformity. Heart:  Regular rate and rhythm, S1, S2 normal, no murmur, click, rub or gallop. Abdomen:   Soft, non-tender. Bowel sounds normal. No masses,  No organomegaly. Extremities: no cyanosis or edema. No calf tenderness or cords. Pulses: 2+ and symmetric all extremities. Skin: Skin color, texture, turgor normal. No rashes    Neurologic:   Alert and oriented X 3. Fine motor of hands and fingers normal.   equal.  No cogwheeling or rigidity. Gait not tested at this time. Sensation grossly normal to touch. Gross motor of extremities normal.       Data Review:     EXAM:  CTA Chest with contrast for Pulmonary Embolus 21  COMPARISON:  2021  FINDINGS:  THYROID: Unremarkable. MEDIASTINUM/SHAHRAM: 15 mm right hilar lymph node. Moderate-sized hiatal hernia  containing portion of the gastric body/antrum (essentially paraesophageal  hernia). HEART/PERICARDIUM: Unremarkable. AORTA:  No aneurysm. PULMONARY ARTERIES:No pulmonary embolism.   LUNGS/PLEURA: Patchy bilateral airspace disease/groundglass opacities. No  pneumothorax or pleural effusion. INCIDENTALLY IMAGED UPPER ABDOMEN: No significant abnormality. BONES: No destructive bone lesion. ADDITIONAL COMMENTS:  N/A  IMPRESSION  1. No acute pulmonary embolus. 2. Patchy bilateral airspace disease. EXAM:  XR CHEST PORT  INDICATION: Shortness of breath  COMPARISON: Chest radiograph 11/7/2021  TECHNIQUE: Upright portable chest AP view  FINDINGS:   Patchy consolidations in bilateral mid and lower lungs concerning for multifocal  pneumonia, significantly progressed compared to prior. Cardiomediastinal  silhouette within normal limits. No definite pleural effusion or pneumothorax  IMPRESSION  Patchy consolidations in bilateral mid and lower lungs concerning for multifocal  pneumonia, significantly progressed compared to prior.         Lab Results   Component Value Date/Time    C-Reactive protein 2.74 (H) 11/18/2021 05:13 AM     Recent Days:  Recent Labs     11/18/21 0513 11/17/21  0004 11/16/21  0058   WBC 4.8 11.8* 9.4   HGB 13.0 12.4 13.3   HCT 40.1 38.0 41.0    313 280     Recent Labs     11/18/21 0513 11/17/21  0004 11/16/21  0058    136 137   K 4.9 4.6 4.7    108 106   CO2 25 23 26   * 124* 137*   BUN 24* 26* 32*   CREA 0.84 0.80 0.83   CA 8.1* 8.0* 7.8*   ALB 2.4* 2.3* 2.3*   TBILI 0.6 0.7 0.7   ALT 61 45 40     No results for input(s): PH, PCO2, PO2, HCO3, FIO2 in the last 72 hours.     24 Hour Results:  Recent Results (from the past 24 hour(s))   METABOLIC PANEL, COMPREHENSIVE    Collection Time: 11/18/21  5:13 AM   Result Value Ref Range    Sodium 136 136 - 145 mmol/L    Potassium 4.9 3.5 - 5.1 mmol/L    Chloride 107 97 - 108 mmol/L    CO2 25 21 - 32 mmol/L    Anion gap 4 (L) 5 - 15 mmol/L    Glucose 114 (H) 65 - 100 mg/dL    BUN 24 (H) 6 - 20 MG/DL    Creatinine 0.84 0.70 - 1.30 MG/DL    BUN/Creatinine ratio 29 (H) 12 - 20      GFR est AA >60 >60 ml/min/1.73m2    GFR est non-AA >60 >60 ml/min/1.73m2 Calcium 8.1 (L) 8.5 - 10.1 MG/DL    Bilirubin, total 0.6 0.2 - 1.0 MG/DL    ALT (SGPT) 61 12 - 78 U/L    AST (SGOT) 37 15 - 37 U/L    Alk. phosphatase 48 45 - 117 U/L    Protein, total 5.8 (L) 6.4 - 8.2 g/dL    Albumin 2.4 (L) 3.5 - 5.0 g/dL    Globulin 3.4 2.0 - 4.0 g/dL    A-G Ratio 0.7 (L) 1.1 - 2.2     CBC WITH AUTOMATED DIFF    Collection Time: 11/18/21  5:13 AM   Result Value Ref Range    WBC 4.8 4.1 - 11.1 K/uL    RBC 4.80 4. 10 - 5.70 M/uL    HGB 13.0 12.1 - 17.0 g/dL    HCT 40.1 36.6 - 50.3 %    MCV 83.5 80.0 - 99.0 FL    MCH 27.1 26.0 - 34.0 PG    MCHC 32.4 30.0 - 36.5 g/dL    RDW 14.5 11.5 - 14.5 %    PLATELET 523 967 - 807 K/uL    MPV 9.8 8.9 - 12.9 FL    NRBC 0.0 0  WBC    ABSOLUTE NRBC 0.00 0.00 - 0.01 K/uL    NEUTROPHILS 69 32 - 75 %    LYMPHOCYTES 11 (L) 12 - 49 %    MONOCYTES 15 (H) 5 - 13 %    EOSINOPHILS 0 0 - 7 %    BASOPHILS 0 0 - 1 %    IMMATURE GRANULOCYTES 5 (H) 0.0 - 0.5 %    ABS. NEUTROPHILS 3.3 1.8 - 8.0 K/UL    ABS. LYMPHOCYTES 0.6 (L) 0.8 - 3.5 K/UL    ABS. MONOCYTES 0.7 0.0 - 1.0 K/UL    ABS. EOSINOPHILS 0.0 0.0 - 0.4 K/UL    ABS. BASOPHILS 0.0 0.0 - 0.1 K/UL    ABS. IMM.  GRANS. 0.2 (H) 0.00 - 0.04 K/UL    DF AUTOMATED     C REACTIVE PROTEIN, QT    Collection Time: 11/18/21  5:13 AM   Result Value Ref Range    C-Reactive protein 2.74 (H) 0.00 - 0.60 mg/dL       Medications reviewed  Current Facility-Administered Medications   Medication Dose Route Frequency    sodium chloride (OCEAN) 0.65 % nasal squeeze bottle 2 Spray  2 Spray Both Nostrils Q2H PRN    sodium chloride (NS) flush 5-40 mL  5-40 mL IntraVENous Q8H    sodium chloride (NS) flush 5-40 mL  5-40 mL IntraVENous PRN    acetaminophen (TYLENOL) tablet 650 mg  650 mg Oral Q6H PRN    polyethylene glycol (MIRALAX) packet 17 g  17 g Oral DAILY PRN    L.acidophilus-paracasei-S.thermophil-bifidobacter (RISAQUAD) 8 billion cell capsule  1 Capsule Oral DAILY    enoxaparin (LOVENOX) injection 30 mg  30 mg SubCUTAneous Q12H    guaiFENesin-dextromethorphan (ROBITUSSIN DM) 100-10 mg/5 mL syrup 5 mL  5 mL Oral Q4H PRN    ondansetron (ZOFRAN ODT) tablet 4 mg  4 mg Oral Q8H PRN    pantoprazole (PROTONIX) tablet 40 mg  40 mg Oral ACB    ascorbic acid (vitamin C) (VITAMIN C) tablet 250 mg  250 mg Oral BID    zinc sulfate (ZINCATE) 50 mg zinc (220 mg) capsule 1 Capsule  1 Capsule Oral DAILY    baricitinib (OLUMIANT) tablet 4 mg  4 mg Oral DAILY    ipratropium-albuterol (COMBIVENT RESPIMAT) 20 mcg-100 mcg inhalation spray  1 Puff Inhalation Q6H PRN    budesonide-formoterol (SYMBICORT) 80-4.5 mcg inhaler  2 Puff Inhalation BID RT    dexAMETHasone (DECADRON) tablet 10 mg  10 mg Oral DAILY       Care Plan discussed with: Patient/Family and Nurse    Total time spent with patient: 30 minutes.     Juan Zazueta MD

## 2021-11-18 NOTE — DISCHARGE INSTRUCTIONS
Patient Discharge Instructions    Audrey Carr / [id number] : 1960    Admitted 2021 Discharged: 2021 10:13 AM     ACUTE DIAGNOSES:  Acute respiratory failure with hypoxia ([facility] [J96.01]    CHRONIC MEDICAL DIAGNOSES:  Problem List as of 2021 Date Reviewed: 2021          Codes Class Noted - Resolved    Pneumonia due to COVID-19 virus ICD-10-CM: U07.1, J12.82  ICD-9-CM: 480.8, 079.89  2021 - Present        Sepsis ([facility] ICD-10-CM: A41.9  ICD-9-CM: 038.9, 995.91  2021 - Present        Fever ICD-10-CM: R50.9  ICD-9-CM: 780.60  2021 - Present        Sinus tachycardia ICD-10-CM: R00.0  ICD-9-CM: 427.89  2021 - Present        * (Principal) Acute respiratory failure with hypoxia ([facility] ICD-10-CM: J96.01  ICD-9-CM: 518.81  2021 - Present              DISCHARGE MEDICATIONS:         · It is important that you take the medication exactly as they are prescribed. · Keep your medication in the bottles provided by the pharmacist and keep a list of the medication names, dosages, and times to be taken in your wallet. · Do not take other medications without consulting your doctor. DIET:  Regular Diet  ACTIVITY: Activity as tolerated    ADDITIONAL INFORMATION: If you experience any of the following symptoms then please call your primary care physician or return to the emergency room if you cannot get hold of your doctor: Fever, chills, nausea, vomiting, diarrhea, change in mentation, falling, bleeding, shortness of breath. FOLLOW UP CARE:  Dr. Danielle Mix, [id number]  you are to call and set up an appointment to see them with in 1 week. Follow-up with specialists at directed by them      Information obtained by :  I understand that if any problems occur once I am at home I am to contact my physician. I understand and acknowledge receipt of the instructions indicated above. Physician's or R.N.'s Signature                                                                  Date/Time                                                                                                                                              Patient or Representative Signature                                                          Date/Time

## 2021-11-18 NOTE — PROGRESS NOTES
Problem: Risk for Spread of Infection  Goal: Prevent transmission of infectious organism to others  Description: Prevent the transmission of infectious organisms to other patients, staff members, and visitors. Outcome: Progressing Towards Goal     Problem: Patient Education:  Go to Education Activity  Goal: Patient/Family Education  Outcome: Progressing Towards Goal     Problem: Falls - Risk of  Goal: *Absence of Falls  Description: Document [place] Fall Risk and appropriate interventions in the flowsheet. Outcome: Progressing Towards Goal  Note: Fall Risk Interventions:  Mobility Interventions: Bed/chair exit alarm, Patient to call before getting OOB         Medication Interventions: Bed/chair exit alarm, Patient to call before getting OOB, Teach patient to arise slowly    Elimination Interventions: Bed/chair exit alarm, Call light in reach              Problem: Patient Education: Go to Patient Education Activity  Goal: Patient/Family Education  Outcome: Progressing Towards Goal     Problem: Airway Clearance - Ineffective  Goal: Achieve or maintain patent airway  Outcome: Progressing Towards Goal     Problem: Gas Exchange - Impaired  Goal: Absence of hypoxia  Outcome: Progressing Towards Goal  Goal: Promote optimal lung function  Outcome: Progressing Towards Goal     Problem: Breathing Pattern - Ineffective  Goal: Ability to achieve and maintain a regular respiratory rate  Outcome: Progressing Towards Goal     Problem:  Body Temperature -  Risk of, Imbalanced  Goal: Ability to maintain a body temperature within defined limits  Outcome: Progressing Towards Goal  Goal: Will regain or maintain usual level of consciousness  Outcome: Progressing Towards Goal  Goal: Complications related to the disease process, condition or treatment will be avoided or minimized  Outcome: Progressing Towards Goal     Problem: Isolation Precautions - Risk of Spread of Infection  Goal: Prevent transmission of infectious organism to others  Outcome: Progressing Towards Goal     Problem: Nutrition Deficits  Goal: Optimize nutrtional status  Outcome: Progressing Towards Goal     Problem: Risk for Fluid Volume Deficit  Goal: Maintain normal heart rhythm  Outcome: Progressing Towards Goal  Goal: Maintain absence of muscle cramping  Outcome: Progressing Towards Goal  Goal: Maintain normal serum potassium, sodium, calcium, phosphorus, and pH  Outcome: Progressing Towards Goal     Problem: Loneliness or Risk for Loneliness  Goal: Demonstrate positive use of time alone when socialization is not possible  Outcome: Progressing Towards Goal     Problem: Fatigue  Goal: Verbalize increase energy and improved vitality  Outcome: Progressing Towards Goal     Problem: Patient Education: Go to Patient Education Activity  Goal: Patient/Family Education  Outcome: Progressing Towards Goal

## 2021-11-18 NOTE — PROGRESS NOTES
In preparation for discharge today, I have completed AVS Med up-dates and added educational information on new medications. Primary nurse updated.

## 2021-11-18 NOTE — PROGRESS NOTES
11/18/21 0844   Resting (Room Air)   SpO2 80   HR 90   During Walk (Room Air)   SpO2 84   HR 88   Comments placed on 2L NC   During Walk (On O2)   SpO2 90   HR 94   O2 Device Nasal cannula   O2 Flow Rate (l/min) 2 l/min   After Walk   SpO2 94   HR 88   O2 Device Nasal cannula   O2 Flow Rate (l/min) 2 l/min   Comments walked in room.  returned to bed on 2L   Does the Patient Qualify for Home O2 Yes   Liter Flow on Exertion 2   Does the Patient Need Portable Oxygen Tanks Yes

## 2021-11-18 NOTE — ROUTINE PROCESS
Bedside and Verbal shift change report given to Dorie Slaugther (oncoming nurse) by Thresa Collet (offgoing nurse). Report included the following information SBAR, Kardex, ED Summary, Procedure Summary, Intake/Output, MAR, Recent Results and Cardiac Rhythm NSR.

## 2021-11-18 NOTE — DISCHARGE SUMMARY
Physician Discharge Summary     Patient ID:    Araceli Andrews  [id number]  70 y.o.  1960  Brennen Joy [address]    Admit date: 11/11/2021  Discharge date and time: 11/18/2021  Admission Diagnoses: Acute respiratory failure with hypoxia [facility]) [J96.01]  Discharge Medications:   Current Discharge Medication List      START taking these medications    Details   acetaminophen (TYLENOL) 325 mg tablet Take 2 Tablets by mouth every six (6) hours as needed for Pain or Fever. Qty: 30 Tablet, Refills: 0      ascorbic acid, vitamin C, (VITAMIN C) 250 mg tablet Take 1 Tablet by mouth two (2) times a day. Qty: 30 Tablet, Refills: 0      budesonide-formoteroL (SYMBICORT) 80-4.5 mcg/actuation HFAA Take 2 Puffs by inhalation two (2) times a day. Qty: 10.2 g, Refills: 0      dexAMETHasone (DECADRON) 4 mg tablet daily  Qty: 8 Tablet, Refills: 0      pantoprazole (PROTONIX) 40 mg tablet Take 1 Tablet by mouth Daily (before breakfast). Qty: 30 Tablet, Refills: 0      zinc sulfate (ZINCATE) 50 mg zinc (220 mg) capsule Take 1 Capsule by mouth daily. Qty: 30 Capsule, Refills: 0         CONTINUE these medications which have NOT CHANGED    Details   albuterol (ProAir HFA) 90 mcg/actuation inhaler Take 1 Puff by inhalation every four (4) hours as needed for Wheezing. Qty: 18 g, Refills: 0              Follow up Care:    1. Brennen Joy [address] with in 1 weeks  2. specialists as directed. Diet:  Regular Diet  Disposition:  Home.   Advanced Directive:  Discharge Exam:  [See today's progress note.]  CONSULTATIONS: Pulmonary/Intensive care    Significant Diagnostic Studies:   Recent Labs     11/18/21  0513 11/17/21  0004   WBC 4.8 11.8*   HGB 13.0 12.4   HCT 40.1 38.0    313     Recent Labs     11/18/21  0513 11/17/21  0004 11/16/21  0058    136 137   K 4.9 4.6 4.7    108 106   CO2 25 23 26   BUN 24* 26* 32*   CREA 0.84 0.80 0.83   * 124* 137*   CA 8.1* 8.0* 7.8*     Recent Labs     11/18/21  0513 11/17/21  0004 11/16/21  0058   ALT 61 45 40   AP 48 45 46   TBILI 0.6 0.7 0.7   TP 5.8* 5.5* 5.4*   ALB 2.4* 2.3* 2.3*   GLOB 3.4 3.2 3.1     Lab Results   Component Value Date/Time    TSH 0.60 11/12/2021 04:34 AM       HOSPITAL COURSE & TREATMENT RENDERED:   1. See today's progress note:  Daily Progress Note and Discharge Note: 11/18/2021  Claudia Solomon, [address]         Assessment/Plan: 1.   Acute respiratory failure with hypoxia due to COVID-19 virus infection  - CTA negative for PE  - D-dimer elevated but falling  - cont O2 as needed - 6 min walk today shows home O2 need at 2 liters  - Pulmonary consulted  - follow up CXR per PCP or Pulmonary  - advised all in his household to be tested  - advised booster vaccine in 90 days     2.  Sepsis due to COVID-19 virus infection. - on decadron and baricitinib inpt - Cont decadron outpt  - Pulm followed. - CRP elevated but falling     3.  Bacterial pneumonia.  This is superimposed on the COVID-19 virus infection.    - procalcitonin elevated, already treated with azithromycin as an outpatient, not currently on antibiotics (stopped by Dr. Rodríguez Jaquez)      4.  Hyperglycemia.  The patient has no history of diabetes.     --checked hemoglobin A1c level- A1c 5.6         Problem List:             Problem List as of 11/18/2021 Date Reviewed: 11/12/2021           Codes Class Noted - Resolved     Pneumonia due to COVID-19 virus ICD-10-CM: U07.1, J12.82  ICD-9-CM: 480.8, 079.89   11/12/2021 - Present           Sepsis ([facility] ICD-10-CM: A41.9  ICD-9-CM: 038.9, 995.91   11/12/2021 - Present           Fever ICD-10-CM: R50.9  ICD-9-CM: 780.60   11/12/2021 - Present           Sinus tachycardia ICD-10-CM: R00.0  ICD-9-CM: 427.89   11/12/2021 - Present           * (Principal) Acute respiratory failure with hypoxia ([facility] ICD-10-CM: J96.01  ICD-9-CM: 518.81   11/11/2021 - Present                Subjective:     This is a 72-year-old man with no significant past medical history who was in his usual state of health until a few days ago when the patient developed body aches and pain as well as shortness of breath.  The shortness of breath is associated with cough which is nonproductive.  The patient was seen in the emergency room and tested positive for COVID.  The patient came back to the emergency room because of worsening shortness of breath.  The shortness of breath is worse with ambulation.  The patient was found to have oxygen saturation of 88% at rest. [facility] was placed on supplemental oxygen in the emergency room and the oxygen level improved to 92%.  The repeated chest x-ray of the patient shows evidence of multifocal pneumonia.  The patient was referred to the hospitalist service for evaluation for admission.  No prior history of respiratory disease.  The patient stated that he is fully vaccinated against COVID-19 virus infection.  No record of prior admission to this hospital.  The patient has no history of heart disease as well. (Dr. Riky Nieto)     11/13: Sats were 80-90% while is was in the room, staying mainly at 88% but desats with breathing. Reports SOB with minimal exertion, not much cough right now. Feels very fatigued.       11/14: Sats in low 90s on 15 LPM.  Reports continued SOB with minimal exertion, continued fatigue. Feels about the same as yesterday, glad he finally made it to a room.     11/15: Still c/o cough and feels SOB but improved from yesterday he feels. Weaned down to 25L high flow this AM.      11/16: Patient feeling okay today. Productive cough, minimal shortness of breath. On 8L of mid flow this AM. CRP trending down.      11/17: On 6L NC.  Gradually reports feeling better. Main complaint is the Neg pressure flow machine is \"too loud. \"   Try to wean O2 further.       11/18: On 2L NC. Felling much better. Will get 6 minute walk today to check for home O2 need. Patient has no SOB currently. May be able to go home later today and he would like that.     940AM:  6 min walk completed and pt needs 2 liters home O2 - ordered. Pt would like to go home today. Advised follow up at our offices tomorrow or early next wk. Cont Decadron until DCed by PCP or Pul at follow up.       Review of Systems:   A comprehensive review of systems was negative except for that written in the HPI.     Objective:   Physical Exam:      Visit Vitals  /73 (BP 1 Location: Left upper arm, BP Patient Position: At rest)   Pulse 71   Temp 97.8 °F (36.6 °C)   Resp 18   Ht 5' 9\" (1.753 m)   Wt 191 lb (86.6 kg)   SpO2 91%   BMI 28.21 kg/m²    O2 Flow Rate (L/min): 2 l/min O2 Device: Nasal cannula  Temp (24hrs), Av.1 °F (36.7 °C), Min:97.8 °F (36.6 °C), Max:98.5 °F (36.9 °C)    1901 -  0700  In: 200 [P.O.:200]  Out: 700 [Urine:700]    07 -  1900  In: 7544 [P.O.:1480]  Out: 3771 [Urine:4395]     General:  Alert, cooperative, no distress, appears stated age. Head:  Normocephalic, without obvious abnormality, atraumatic. Eyes:  Conjunctivae/corneas clear. PERRL, EOMs intact. Nose: High flow in place   Throat: Lips, mucosa, and tongue moist..   Neck: Supple, symmetrical, trachea midline, no adenopathy, thyroid: no enlargement/tenderness/nodules, no carotid bruit and no JVD. Back:   Symmetric, no curvature. ROM normal. No CVA tenderness. Lungs:   Scattered rales bilaterally in lower lobes   Chest wall:  No tenderness or deformity. Heart:  Regular rate and rhythm, S1, S2 normal, no murmur, click, rub or gallop. Abdomen:   Soft, non-tender. Bowel sounds normal. No masses,  No organomegaly. Extremities: no cyanosis or edema. No calf tenderness or cords. Pulses: 2+ and symmetric all extremities. Skin: Skin color, texture, turgor normal. No rashes    Neurologic:   Alert and oriented X 3. Fine motor of hands and fingers normal.   equal.  No cogwheeling or rigidity. Gait not tested at this time. Sensation grossly normal to touch.   Gross motor of extremities normal.    Data Review:     EXAM:  CTA Chest with contrast for Pulmonary Embolus 11/12/21  COMPARISON:  11/11/2021  FINDINGS:  THYROID: Unremarkable. MEDIASTINUM/SHAHRAM: 15 mm right hilar lymph node. Moderate-sized hiatal hernia  containing portion of the gastric body/antrum (essentially paraesophageal  hernia). HEART/PERICARDIUM: Unremarkable. AORTA:  No aneurysm. PULMONARY ARTERIES:No pulmonary embolism. LUNGS/PLEURA: Patchy bilateral airspace disease/groundglass opacities. No  pneumothorax or pleural effusion. INCIDENTALLY IMAGED UPPER ABDOMEN: No significant abnormality. BONES: No destructive bone lesion. ADDITIONAL COMMENTS:  N/A  IMPRESSION  1. No acute pulmonary embolus. 2. Patchy bilateral airspace disease.     EXAM:  XR CHEST PORT  INDICATION: Shortness of breath  COMPARISON: Chest radiograph 11/7/2021  TECHNIQUE: Upright portable chest AP view  FINDINGS:   Patchy consolidations in bilateral mid and lower lungs concerning for multifocal  pneumonia, significantly progressed compared to prior. Cardiomediastinal  silhouette within normal limits.  No definite pleural effusion or pneumothorax  IMPRESSION  Patchy consolidations in bilateral mid and lower lungs concerning for multifocal  pneumonia, significantly progressed compared to prior.          Signed:  Deana Lyman MD  11/18/2021  10:13 AM

## 2021-11-18 NOTE — PROGRESS NOTES
Discharge instructions and prescription information reviewed with the patient and all questions answered. AVS e-signed. Peripheral IV removed.   Patient to be discharged home via wheelchair with wife upon her arrival.

## 2021-11-18 NOTE — PROGRESS NOTES
[facility]     Name: Mansoor Hernandez MRN: [id number]   : 1960 Hospital: [address]   Date: 2021        Impression Plan   1. Acute respiratory failure  2. Hypoxia  3. COVID 19 PNA               · Wean O2 to keep sats above 90%; weaned to 5L NC  · Continue dexamethason 10 mg daily; wean over next 7 days  · Cont Baricitinib  · Pt instructed to self prone 3 hrs a night  · OOB as much as possible  · Follow d-dimer  · Encourage IS use and proning  · enox 30 mg q12h     is stable from a pulmonary standpoint. We will sign off and arrange for outpatient follow-up in 2-3 weeks. Please call with questions. Radiology  ( personally reviewed) CTA chest: bilateral infiltrates, no PE   ABG No results for input(s): PHI, PO2I, PCO2I in the last 72 hours. Subjective     Cc: shortness of breath    65 yo with no PMHx presenting with increasing shortness of breath and weakness. COVID 19 positive. Pt states that he has been sick since 11/3. Pt had J&J vaccine 10/25/21. Non-smoker. No underlying lung problems. Currently on 10 L O2    Review of Systems:  A comprehensive review of systems was negative except for that written in the HPI. Interval History:    Afebrile  BP stable  Sats 96% on 2L NC  WBC 11.8--increased  11/15 D-dimer 1.12--increased slightly  CRP 2.74--increased  C.diff negative    ROS:    Feels a lot better. Denies SOB/cough. Using IS. Appetite good. Wants to go home. No past medical history on file. No past surgical history on file. Prior to Admission medications    Medication Sig Start Date End Date Taking? Authorizing Provider   acetaminophen (TYLENOL) 325 mg tablet Take 2 Tablets by mouth every six (6) hours as needed for Pain or Fever. 21  Yes Mandy Bloom MD   ascorbic acid, vitamin C, (VITAMIN C) 250 mg tablet Take 1 Tablet by mouth two (2) times a day.  21  Yes Mandy Bloom MD   budesonide-formoteroL (SYMBICORT) 80-4.5 mcg/actuation HFAA Take 2 Puffs by inhalation two (2) times a day. 11/18/21  Yes Lovette Nissen, MD   dexAMETHasone (DECADRON) 4 mg tablet daily 11/19/21  Yes Lovette Nissen, MD   pantoprazole (PROTONIX) 40 mg tablet Take 1 Tablet by mouth Daily (before breakfast). 11/19/21  Yes Lovette Nissen, MD   zinc sulfate (ZINCATE) 50 mg zinc (220 mg) capsule Take 1 Capsule by mouth daily. 11/19/21  Yes Lovette Nissen, MD   albuterol (ProAir HFA) 90 mcg/actuation inhaler Take 1 Puff by inhalation every four (4) hours as needed for Wheezing. 11/7/21  Yes Lyndsay GALLAGHER NP     Current Facility-Administered Medications   Medication Dose Route Frequency    sodium chloride (NS) flush 5-40 mL  5-40 mL IntraVENous Q8H    L.acidophilus-paracasei-S.thermophil-bifidobacter (RISAQUAD) 8 billion cell capsule  1 Capsule Oral DAILY    enoxaparin (LOVENOX) injection 30 mg  30 mg SubCUTAneous Q12H    pantoprazole (PROTONIX) tablet 40 mg  40 mg Oral ACB    ascorbic acid (vitamin C) (VITAMIN C) tablet 250 mg  250 mg Oral BID    zinc sulfate (ZINCATE) 50 mg zinc (220 mg) capsule 1 Capsule  1 Capsule Oral DAILY    baricitinib (OLUMIANT) tablet 4 mg  4 mg Oral DAILY    budesonide-formoterol (SYMBICORT) 80-4.5 mcg inhaler  2 Puff Inhalation BID RT    dexAMETHasone (DECADRON) tablet 10 mg  10 mg Oral DAILY     No Known Allergies   Social History     Tobacco Use    Smoking status: Never Smoker    Smokeless tobacco: Never Used   Substance Use Topics    Alcohol use: Not on file      No family history on file. Laboratory: I have personally reviewed the critical care flowsheet and labs.      Recent Labs     11/18/21 0513 11/17/21  0004 11/16/21  0058   WBC 4.8 11.8* 9.4   HGB 13.0 12.4 13.3   HCT 40.1 38.0 41.0    313 280     Recent Labs     11/18/21 0513 11/17/21  0004 11/16/21  0058    136 137   K 4.9 4.6 4.7    108 106   CO2 25 23 26   * 124* 137*   BUN 24* 26* 32* CREA 0.84 0.80 0.83   CA 8.1* 8.0* 7.8*   ALB 2.4* 2.3* 2.3*   ALT 61 45 40       Objective:     Mode Rate Tidal Volume Pressure FiO2 PEEP            94 %       Vital Signs:     TMAX(24)      Intake/Output:   Last shift:         Last 3 shifts: 11/18 0701 - 11/18 1900  In: -   Out: 300 [Urine:300][id number]    Intake/Output Summary (Last 24 hours) at 11/18/2021 1053  Last data filed at 11/18/2021 7304  Gross per 24 hour   Intake 640 ml   Output 2370 ml   Net -1730 ml     EXAM:   GENERAL: awake, alert, on 8L midflow HFHEENT:  PERRL, EOMI, no alar flaring or epistaxis, oral mucosa moist without cyanosis, NECK:  no jugular vein distention, no retractions, no thyromegaly or masses, LUNGS: CTA, no wheezes , HEART:  Regular rate and rhythm with no MGR; no edema is present, ABDOMEN:  soft with no tenderness, bowel sounds present, EXTREMITIES:  warm with no cyanosis, SKIN:  no jaundice or ecchymosis and NEUROLOGIC:  alert and oriented, grossly non-focal    Mane Barron, NP  [facility]

## 2021-11-18 NOTE — PROGRESS NOTES
1900: Bedside shift change report given to Sammie Ambriz (oncoming nurse) by Anand Richardson (offgoing nurse). Report included the following information SBAR, Kardex, Intake/Output, MAR, Recent Results and Cardiac Rhythm NSR.

## 2021-11-18 NOTE — PROGRESS NOTES
CM Note:  Pt needs home O2. Spoke with pt by phone who stated he had no preference for a DME company. Call to Dr. Kenyetta Glaser who will d/c pt today. Order for home O2 noted. A referral was sent in [facility] to [address] [place] [address].  Family to transport pt home at d/c. Pt to f/u OP with providers.   BROOK Ramos

## 2021-11-19 ENCOUNTER — PATIENT OUTREACH (OUTPATIENT)
Dept: CASE MANAGEMENT | Age: 61
End: 2021-11-19

## 2021-11-19 NOTE — PROGRESS NOTES
Patient contacted regarding COVID-19 diagnosis. Discussed COVID-19 related testing which was available at this time. Test results were positive. Patient informed of results, if available? no.     Care Transition Nurse contacted the patient by telephone to perform post discharge assessment. Call within 2 business days of discharge: Yes Verified name and  with patient as identifiers. Provided introduction to self, and explanation of the CTN/ACM role, and reason for call due to risk factors for infection and/or exposure to COVID-19. Symptoms reviewed with patient who verbalized the following symptoms: no new symptoms and no worsening symptoms      Due to no new or worsening symptoms encounter was not routed to provider for escalation. Discussed follow-up appointments. If no appointment was previously scheduled, appointment scheduling offered:  yes. [address] follow up appointment(s): No future appointments. Non-[facility] follow up appointment(s): [facility] [place] 11/22/21 at 10 AM virtually    Interventions to address risk factors: Scheduled appointment with PCP-[facility] [place] 11/22/21 10 AM Virtually and Assessment and support for treatment adherence and medication management-Cough, O2     Advance Care Planning:   Does patient have an Advance Directive: decision makers updated. Educated patient about risk for severe COVID-19 due to risk factors according to CDC guidelines. CTN reviewed discharge instructions, medical action plan and red flag symptoms with the patient who verbalized understanding. Discussed COVID vaccination status: yes. Education provided on COVID-19 vaccination as appropriate. Discussed exposure protocols and quarantine with CDC Guidelines. Patient was given an opportunity to verbalize any questions and concerns and agrees to contact CTN or health care provider for questions related to their healthcare.     Reviewed and educated patient on any new and changed medications related to discharge diagnosis     Was patient discharged with a pulse oximeter? No Already has his own pulse OX  Discussed and confirmed pulse oximeter discharge instructions and when to notify provider or seek emergency care. CTN provided contact information. Plan for follow-up call in 5-7 days based on severity of symptoms and risk factors. Goals Addressed                 This Visit's Progress     Prevent complications post hospitalization. 11/19/21    Patient with mostly dry cough with some clear mucus, Sat running 90%. Continues to wear his O2 at 2/L.  Patient denies SOB, fever or body aches.  Patient has an appt with PCP on 11/22/21 at 10 am Virtually. Monitor cough and O2 usage and if attended PCP F/U as scheduled.     Antony Ray MSN, RN, CCM / Care Transition Nurse / [phone]

## 2021-11-29 ENCOUNTER — PATIENT OUTREACH (OUTPATIENT)
Dept: CASE MANAGEMENT | Age: 61
End: 2021-11-29

## 2021-11-29 NOTE — PROGRESS NOTES
Care Transitions Follow Up CallCare Transition Nurse (CTN) contacted the patient by telephone to follow up. Verified name and  with patient as identifiers. Addressed changes since last contact: none  Follow up appointment completed? yes. Was follow up appointment scheduled within 7 days of discharge? yes. CTN reviewed discharge instructions, medical action plan and red flags with patient and discussed any barriers to care and/or understanding of plan of care after discharge. Discussed appropriate site of care based on symptoms and resources available to patient including: PCP. The patient agrees to contact the PCP office for questions related to their healthcare. Patients top risk factors for readmission: medical condition-COVID   Interventions to address risk factors: Education of patient/family/caregiver/guardian to support self-management-Incentive Spirometer, O2 wean    [facility] follow up appointment(s): No future appointments. Non-[facility] follow up appointment(s): none    CTN provided contact information for future needs. Plan for follow-up call in 10-14 days based on severity of symptoms and risk factors. Plan for next call: self management-incentive spirometer, O2 wean     Goals Addressed                 This Visit's Progress     Prevent complications post hospitalization. On track     21    Patient with mostly dry cough with some clear mucus, Sat running 90%. Continues to wear his O2 at 2/L.  Patient denies SOB, fever or body aches.  Patient has an appt with PCP on 21 at 10 am Virtually. Monitor cough and O2 usage and if attended PCP F/U as scheduled. Robin Nickerson MSN, RN, [facility] / Care Transition Nurse / [phone]     21    Patient with dry cough, sat running 95-96% on 2/L. Patient states his lungs feel \"tight\".  Patient does have an incentive spirometer and only using it 2-3 times a day.      Encouraged patient to bump up incentive spirometer more times a day.  Also to start decreasing O2 gradually to keep O2 Sat at least 93-95%.  Patient did attend his PCP appt as scheduled. Monitor incentive spirometer usage, lung tightness and O2 usage on next call.       Brock Donovan MSN, RN, CCM / Care Transition Nurse / [phone]

## 2021-12-10 ENCOUNTER — PATIENT OUTREACH (OUTPATIENT)
Dept: CASE MANAGEMENT | Age: 61
End: 2021-12-10

## 2021-12-10 NOTE — PROGRESS NOTES
Care Transitions Follow Up Call    Care Transition Nurse (CTN) contacted the patient by telephone to follow up. Verified name and  with patient as identifiers. Addressed changes since last contact: none  Follow up appointment completed? yes. Was follow up appointment scheduled within 7 days of discharge? no.       CTN reviewed discharge instructions, medical action plan and red flags with patient and discussed any barriers to care and/or understanding of plan of care after discharge. Discussed appropriate site of care based on symptoms and resources available to patient including: PCP and Specialist. The patient agrees to contact the PCP office for questions related to their healthcare. Patients top risk factors for readmission: medical condition-SOB, cough   Interventions to address risk factors: Scheduled appointment with PCP-Has seen PCP, Scheduled appointment with Specialist-Has seen Pulm and Assessment and support for treatment adherence and medication management-COVID    [address] follow up appointment(s): No future appointments. Non-[facility] follow up appointment(s): none    CTN provided contact information for future needs. Plan for follow-up call in 10-14 days based on severity of symptoms and risk factors. Goals Addressed                 This Visit's Progress     Prevent complications post hospitalization. On track     21    Patient with mostly dry cough with some clear mucus, Sat running 90%. Continues to wear his O2 at 2/L.  Patient denies SOB, fever or body aches.  Patient has an appt with PCP on 21 at 10 am Virtually. Monitor cough and O2 usage and if attended PCP F/U as scheduled. Silvia Palomo MSN, RN, [facility] / Care Transition Nurse / [phone]     21    Patient with dry cough, sat running 95-96% on 2/L. Patient states his lungs feel \"tight\".  Patient does have an incentive spirometer and only using it 2-3 times a day.      Encouraged patient to bump up incentive spirometer more times a day. Also to start decreasing O2 gradually to keep O2 Sat at least 93-95%.  Patient did attend his PCP appt as scheduled. Monitor incentive spirometer usage, lung tightness and O2 usage on next call. Linda Cr MSN, RN, CCM / Care Transition Nurse / [phone]     12/10/21    Patient was seen by Kat Pantoja today and is to gradually decrease his O2, currently at 1.5/L    Has an occasional cough and R/T mold allergies. Has an occasional tightness in lungs but this has improved. Continues to use incentive spirometer. Monitor cough, tightness and O2 status on next call.     Linda Cr MSN, RN, CCM / Care Transition Nurse / [phone]

## 2021-12-21 ENCOUNTER — PATIENT OUTREACH (OUTPATIENT)
Dept: CASE MANAGEMENT | Age: 61
End: 2021-12-21

## 2021-12-21 NOTE — PROGRESS NOTES
Patient resolved from Transition of Care episode on 12/21/21. CTN was unsuccessful at contacting this patient today. Patient/family was provided the following resources and education related to COVID-19 during the initial call:                         Signs, symptoms and red flags related to COVID-19            CDC exposure and quarantine guidelines                      Contact for their local Department of Health                 Patient has not had any additional ED or hospital visits. No further outreach scheduled with this CTN, Episode of Care resolved. Patient has this CTN contact information if future needs arise. Goals Addressed                 This Visit's Progress     COMPLETED: Prevent complications post hospitalization. 11/19/21    Patient with mostly dry cough with some clear mucus, Sat running 90%. Continues to wear his O2 at 2/L.  Patient denies SOB, fever or body aches.  Patient has an appt with PCP on 11/22/21 at 10 am [facility]. Monitor cough and O2 usage and if attended PCP F/U as scheduled. Dayanna MARTINEZ, RN, [facility] / Care Transition Nurse / [phone]     11/29/21    Patient with dry cough, sat running 95-96% on 2/L. Patient states his lungs feel \"tight\".  Patient does have an incentive spirometer and only using it 2-3 times a day.  Encouraged patient to bump up incentive spirometer more times a day. Also to start decreasing O2 gradually to keep O2 Sat at least 93-95%.  Patient did attend his PCP appt as scheduled. Monitor incentive spirometer usage, lung tightness and O2 usage on next call. Dayanna MARTINEZ, RN, [facility] / Care Transition Nurse / [phone]     12/10/21    Patient was seen by Mirlande Denny today and is to gradually decrease his O2, currently at 1.5/L    Has an occasional cough and R/T mold allergies. Has an occasional tightness in lungs but this has improved. Continues to use incentive spirometer.     Monitor cough, tightness and O2 status on next call. Dwayne Herring MSN, RN, [facility] / Care Transition Nurse / [phone]     12/21/21   Care Transitions Outreach Attempt-LMTCB.     Dwayne Herring MSN, RN, [facility]CM / Care Transition Nurse / [phone]

## 2022-03-18 PROBLEM — R00.0 SINUS TACHYCARDIA: Status: ACTIVE | Noted: 2021-11-12

## 2022-03-19 PROBLEM — J96.01 ACUTE RESPIRATORY FAILURE WITH HYPOXIA (HCC): Status: ACTIVE | Noted: 2021-11-11

## 2022-03-19 PROBLEM — J12.82 PNEUMONIA DUE TO COVID-19 VIRUS: Status: ACTIVE | Noted: 2021-11-12

## 2022-03-19 PROBLEM — R50.9 FEVER: Status: ACTIVE | Noted: 2021-11-12

## 2022-03-19 PROBLEM — U07.1 PNEUMONIA DUE TO COVID-19 VIRUS: Status: ACTIVE | Noted: 2021-11-12

## 2022-03-20 PROBLEM — A41.9 SEPSIS (HCC): Status: ACTIVE | Noted: 2021-11-12

## 2022-06-01 ENCOUNTER — HOSPITAL ENCOUNTER (OUTPATIENT)
Dept: GENERAL RADIOLOGY | Age: 62
Discharge: HOME | End: 2022-06-01
Payer: COMMERCIAL

## 2022-06-01 ENCOUNTER — TRANSCRIBE ORDER (OUTPATIENT)
Dept: REGISTRATION | Age: 62
End: 2022-06-01

## 2022-06-01 DIAGNOSIS — U07.1 PNEUMONIA DUE TO COVID-19 VIRUS: Primary | ICD-10-CM

## 2022-06-01 DIAGNOSIS — J12.82 PNEUMONIA DUE TO COVID-19 VIRUS: ICD-10-CM

## 2022-06-01 DIAGNOSIS — J12.82 PNEUMONIA DUE TO COVID-19 VIRUS: Primary | ICD-10-CM

## 2022-06-01 DIAGNOSIS — U07.1 PNEUMONIA DUE TO COVID-19 VIRUS: ICD-10-CM

## 2022-06-01 PROCEDURE — 71046 X-RAY EXAM CHEST 2 VIEWS: CPT
